# Patient Record
Sex: MALE | Race: WHITE | Employment: FULL TIME | URBAN - METROPOLITAN AREA
[De-identification: names, ages, dates, MRNs, and addresses within clinical notes are randomized per-mention and may not be internally consistent; named-entity substitution may affect disease eponyms.]

---

## 2020-03-09 ENCOUNTER — OFFICE VISIT (OUTPATIENT)
Dept: FAMILY MEDICINE CLINIC | Age: 30
End: 2020-03-09
Payer: COMMERCIAL

## 2020-03-09 VITALS
DIASTOLIC BLOOD PRESSURE: 70 MMHG | BODY MASS INDEX: 31.78 KG/M2 | TEMPERATURE: 98.6 F | HEIGHT: 69 IN | RESPIRATION RATE: 16 BRPM | SYSTOLIC BLOOD PRESSURE: 110 MMHG | HEART RATE: 72 BPM | WEIGHT: 214.6 LBS | OXYGEN SATURATION: 97 %

## 2020-03-09 PROBLEM — I83.92 VARICOSE VEINS OF LEFT LOWER EXTREMITY: Status: ACTIVE | Noted: 2020-03-09

## 2020-03-09 PROBLEM — Z91.199 NO-SHOW FOR APPOINTMENT: Status: ACTIVE | Noted: 2017-03-03

## 2020-03-09 PROCEDURE — 99203 OFFICE O/P NEW LOW 30 MIN: CPT | Performed by: NURSE PRACTITIONER

## 2020-03-09 RX ORDER — IBUPROFEN 800 MG/1
1 TABLET ORAL WEEKLY
COMMUNITY
Start: 2019-12-04 | End: 2020-10-19

## 2020-03-09 SDOH — ECONOMIC STABILITY: FOOD INSECURITY: WITHIN THE PAST 12 MONTHS, YOU WORRIED THAT YOUR FOOD WOULD RUN OUT BEFORE YOU GOT MONEY TO BUY MORE.: NEVER TRUE

## 2020-03-09 SDOH — ECONOMIC STABILITY: TRANSPORTATION INSECURITY
IN THE PAST 12 MONTHS, HAS THE LACK OF TRANSPORTATION KEPT YOU FROM MEDICAL APPOINTMENTS OR FROM GETTING MEDICATIONS?: NO

## 2020-03-09 SDOH — ECONOMIC STABILITY: INCOME INSECURITY: HOW HARD IS IT FOR YOU TO PAY FOR THE VERY BASICS LIKE FOOD, HOUSING, MEDICAL CARE, AND HEATING?: NOT HARD AT ALL

## 2020-03-09 SDOH — ECONOMIC STABILITY: FOOD INSECURITY: WITHIN THE PAST 12 MONTHS, THE FOOD YOU BOUGHT JUST DIDN'T LAST AND YOU DIDN'T HAVE MONEY TO GET MORE.: NEVER TRUE

## 2020-03-09 SDOH — ECONOMIC STABILITY: TRANSPORTATION INSECURITY
IN THE PAST 12 MONTHS, HAS LACK OF TRANSPORTATION KEPT YOU FROM MEETINGS, WORK, OR FROM GETTING THINGS NEEDED FOR DAILY LIVING?: NO

## 2020-03-09 SDOH — HEALTH STABILITY: MENTAL HEALTH: HOW OFTEN DO YOU HAVE A DRINK CONTAINING ALCOHOL?: NEVER

## 2020-03-09 ASSESSMENT — ENCOUNTER SYMPTOMS
RESPIRATORY NEGATIVE: 1
EYES NEGATIVE: 1
BACK PAIN: 1

## 2020-03-09 ASSESSMENT — PATIENT HEALTH QUESTIONNAIRE - PHQ9
1. LITTLE INTEREST OR PLEASURE IN DOING THINGS: 0
SUM OF ALL RESPONSES TO PHQ QUESTIONS 1-9: 0
SUM OF ALL RESPONSES TO PHQ QUESTIONS 1-9: 0
SUM OF ALL RESPONSES TO PHQ9 QUESTIONS 1 & 2: 0
2. FEELING DOWN, DEPRESSED OR HOPELESS: 0

## 2020-03-09 NOTE — PATIENT INSTRUCTIONS
pressure with a clean bandage over the site of the bleeding. Continue to apply pressure for a full 15 minutes. Do not check sooner to see if the bleeding has stopped. If the bleeding has not stopped after 15 minutes, apply pressure again for another 15 minutes. You can repeat this up to 3 times for a total of 45 minutes. If you have a blood clot in a varicose vein, you may have tenderness and swelling over the vein. The vein may feel firm. Be sure to call your doctor right away if you have these symptoms. If your doctor has told you how to care for the clot, follow his or her instructions. Care may include the following:  · Prop up your leg and apply heat with a warm, damp cloth or a heating pad set on low (put a towel or cloth between your leg and the heating pad to prevent burns). · Ask your doctor if you can take an over-the-counter pain medicine, such as acetaminophen (Tylenol), ibuprofen (Advil, Motrin), or naproxen (Aleve). Be safe with medicines. Read and follow all instructions on the label. When should you call for help? Call 911 anytime you think you may need emergency care. For example, call if:    · You have sudden chest pain and shortness of breath, or you cough up blood.    Call your doctor now or seek immediate medical care if:    · You have signs of a blood clot, such as:  ? Pain in your calf, back of the knee, thigh, or groin. ? Redness and swelling in your leg or groin.     · A varicose vein begins to bleed and you cannot stop it.     · You have a tender lump in your leg.     · You get an open sore.    Watch closely for changes in your health, and be sure to contact your doctor if:    · Your varicose vein symptoms do not improve with home treatment. Where can you learn more? Go to [email or web address].[email or web address] org and sign in to your [employer] account. Enter N070 in the [facility] box to learn more about \"Varicose Veins: Care Instructions. \"     If you do not have an account, please click on the \"Sign Up Now\" link. Current as of: July 14, 2019  Content Version: 12.3  © 9492-4558 Healthwise, Incorporated. Care instructions adapted under license by [facility] ([place]). If you have questions about a medical condition or this instruction, always ask your healthcare professional. [address] any warranty or liability for your use of this information.

## 2020-03-09 NOTE — PROGRESS NOTES
Mouth/Throat:      Mouth: Mucous membranes are moist.      Pharynx: Oropharynx is clear. Eyes:      Extraocular Movements: Extraocular movements intact. Pupils: Pupils are equal, round, and reactive to light. Neck:      Musculoskeletal: Normal range of motion. Cardiovascular:      Rate and Rhythm: Normal rate and regular rhythm. Pulses: Normal pulses. Heart sounds: Normal heart sounds. Pulmonary:      Effort: Pulmonary effort is normal.      Breath sounds: Normal breath sounds. Abdominal:      General: Abdomen is flat. Palpations: Abdomen is soft. Musculoskeletal:      Thoracic back: He exhibits decreased range of motion, tenderness and pain. Lumbar back: He exhibits tenderness and pain. Right lower leg: No edema. Left lower leg: No edema. Skin:         Neurological:      Mental Status: He is alert. Assessment:      1. Encounter to establish care  Start PT for back pain as discussed  /  Complete labs  /  See Urology for consult  /  Diet and exercise within tolerance  /  Return in 6 weeks for fasting labs and symptom management     2. Visit for vasectomy evaluation    - [facility] - Davin Han MD, Urology, [facility]    3. Chronic midline low back pain without sciatica    - External Referral To Physical Therapy    4. Encounter for screening for cardiovascular disorders    - Comprehensive Metabolic Panel; Future  - CBC; Future  - Lipid, Fasting; Future    5. Anxiety    - TSH; Future       Plan:      Return in about 6 weeks (around 4/20/2020) for back pain and fasting labs .   Orders Placed This Encounter   Procedures    Comprehensive Metabolic Panel     Standing Status:   Future     Standing Expiration Date:   3/9/2021    CBC     Standing Status:   Future     Standing Expiration Date:   3/9/2021    Lipid, Fasting     Standing Status:   Future     Standing Expiration Date:   3/9/2021    TSH     Standing Status:   Future     Standing Expiration Date:   3/9/2021   [facility] Francesca Vines MD, Urology, [facility]     Referral Priority:   Routine     Referral Type:   Eval and Treat     Referral Reason:   Specialty Services Required     Referred to Provider:   Maria Guadalupe Kessler MD     Requested Specialty:   Urology     Number of Visits Requested:   1    External Referral To Physical Therapy     Referral Priority:   Routine     Referral Type:   Eval and Treat     Referral Reason:   Specialty Services Required     Requested Specialty:   Physical Therapy     Number of Visits Requested:   1     No orders of the defined types were placed in this encounter. Patient given educational materials - see patient instructions. Discussed use, benefit, and side effects of prescribed medications. All patient questions answered. Pt voiced understanding. Reviewed health maintenance. Instructed to continue current medications, diet and exercise. Patient agreed with treatment plan. Follow up as directed below.      Electronically signed by JED Mendez on 3/9/2020 at 4:14 PM

## 2020-04-01 ENCOUNTER — TELEPHONE (OUTPATIENT)
Dept: FAMILY MEDICINE CLINIC | Age: 30
End: 2020-04-01

## 2020-04-01 NOTE — TELEPHONE ENCOUNTER
Patient contacted the office stating he had a fever of 100.3 on 3/31/20-4/1/20. He did stay home from work. He has been taking ibuprofen for his fever which is improving. Did suggest switching to tylenol and patient verbalized understanding. Denies any cough, sob, wheezing or sore throat. No further symptoms at this time. He was not evaluated anywhere for the fever; self quarantining at home. States employer is not taking COVID19 safety precautions at work and they are requiring a note excusing him those days. Please advise.

## 2020-09-15 ENCOUNTER — OFFICE VISIT (OUTPATIENT)
Dept: FAMILY MEDICINE CLINIC | Age: 30
End: 2020-09-15
Payer: COMMERCIAL

## 2020-09-15 VITALS
HEART RATE: 103 BPM | HEIGHT: 70 IN | BODY MASS INDEX: 31.5 KG/M2 | OXYGEN SATURATION: 99 % | SYSTOLIC BLOOD PRESSURE: 108 MMHG | WEIGHT: 220 LBS | DIASTOLIC BLOOD PRESSURE: 70 MMHG | TEMPERATURE: 96.9 F

## 2020-09-15 PROCEDURE — G8427 DOCREV CUR MEDS BY ELIG CLIN: HCPCS | Performed by: NURSE PRACTITIONER

## 2020-09-15 PROCEDURE — G8417 CALC BMI ABV UP PARAM F/U: HCPCS | Performed by: NURSE PRACTITIONER

## 2020-09-15 PROCEDURE — 1036F TOBACCO NON-USER: CPT | Performed by: NURSE PRACTITIONER

## 2020-09-15 PROCEDURE — 99214 OFFICE O/P EST MOD 30 MIN: CPT | Performed by: NURSE PRACTITIONER

## 2020-09-15 RX ORDER — IBUPROFEN 600 MG/1
600 TABLET ORAL 3 TIMES DAILY PRN
Qty: 30 TABLET | Refills: 1 | Status: SHIPPED | OUTPATIENT
Start: 2020-09-15 | End: 2020-12-22 | Stop reason: SDUPTHER

## 2020-09-15 RX ORDER — TIZANIDINE 2 MG/1
4 TABLET ORAL EVERY 6 HOURS PRN
Qty: 30 TABLET | Refills: 0 | Status: SHIPPED | OUTPATIENT
Start: 2020-09-15 | End: 2020-09-28 | Stop reason: SDUPTHER

## 2020-09-15 ASSESSMENT — ENCOUNTER SYMPTOMS
BACK PAIN: 1
BOWEL INCONTINENCE: 1

## 2020-09-15 NOTE — PROGRESS NOTES
[facility]  [place] [address]  [phone]    Date of Visit:  2020  Patient :  1990   CHIEF COMPLAINT:     Surekha Hernández is a 35 y.o. male who presents today for an acute visit to be evaluated for the following condition:  Chief Complaint   Patient presents with    Back Pain       REVIEW OF SYSTEM      Review of Systems   Gastrointestinal: Positive for bowel incontinence. Genitourinary: Positive for bladder incontinence. Musculoskeletal: Positive for back pain. Lower back pain, chronic. Neurological: Negative for weakness (right side weakness ). HISTORY OF PRESENT ILLNESS     Back Pain   This is a chronic (worsening after initial soccer. ) problem. The problem occurs constantly. The pain is present in the lumbar spine. The quality of the pain is described as shooting. The pain radiates to the right thigh. The pain is at a severity of 5/10 (at time shooting ). The pain is moderate. The symptoms are aggravated by bending. Stiffness is present all day. Associated symptoms include bladder incontinence and bowel incontinence. Pertinent negatives include no weakness (right side weakness ). Risk factors: history of Hamstring Graft +10 years ago. Treatments tried: rest - ice hot.         Patient had significant injury on right side ACL and meniscus tear, which led hamstring graft by Dr. Shea in      REVIEWED INFORMATION      No Known Allergies    Patient Active Problem List   Diagnosis    Anxiety    Current smoker    No-show for appointment    Panic attack    Varicose veins of left lower extremity       Past Medical History:   Diagnosis Date    Depression     Headache     Insomnia        PHYSICAL EXAM   ASS  Vitals:    09/15/20 1327   BP: 108/70   Site: Left Upper Arm   Position: Sitting   Cuff Size: Large Adult   Pulse: 103   Temp: 96.9 °F (36.1 °C)   SpO2: 99%   Weight: 220 lb (99.8 kg)   Height: 5' 10\" (1.778 m)     Physical Exam  Vitals Refill: 1  - tiZANidine (ZANAFLEX) 2 MG tablet; Take 2 tablets by mouth every 6 hours as needed (muscle cramping right thigh pain)  Dispense: 30 tablet; Refill: 0  - External Referral To Physical Therapy      Return in about 2 weeks (around 9/29/2020) for recheck symptoms of today's primary complaint. COMMUNICATION:           The best way to find yourself is to lose yourself in the service of others - [address] [facility]. [address] [facility]   [email or web address]  Office: [phone]   Cell: [phone]    Electronically signed by JED Quinteros on 9/20/2020 at 3:10 PM

## 2020-09-25 NOTE — TELEPHONE ENCOUNTER
LOV 9/15/20  LRF 9/15/20   RTO 2 weeks, Scheduled    Health Maintenance   Topic Date Due    DTaP/Tdap/Td vaccine (6 - Tdap) 02/21/2002    HIV screen  02/21/2006    Flu vaccine (1) 09/01/2020    Hepatitis A vaccine  Aged Out    Hepatitis B vaccine  Aged Out    Hib vaccine  Aged Out    Meningococcal (ACWY) vaccine  Aged Out    Pneumococcal 0-64 years Vaccine  Aged Out    Varicella vaccine  Discontinued             (applicable per patient's age: Cancer Screenings, Depression Screening, Fall Risk Screening, Immunizations)    No results found for: LABA1C, LABMICR, LDLCHOLESTEROL, LDLCALC, AST, ALT, BUN   (goal A1C is < 7)   (goal LDL is <100) need 30-50% reduction from baseline     BP Readings from Last 3 Encounters:   09/15/20 108/70   03/09/20 110/70    (goal /80)      All Future Testing planned in [facility]:  Lab Frequency Next Occurrence   Comprehensive Metabolic Panel Once 28/80/1783   CBC Once 03/09/2020   Lipid, Fasting Once 03/09/2020   TSH Once 03/09/2020       Next Visit Date:  Future Appointments   Date Time Provider Papito Baca   9/29/2020  1:30 PM Ema Hammans, APRN - [facility] [address] [facility]            Patient Active Problem List:     Anxiety     Current smoker     No-show for appointment     Panic attack     Varicose veins of left lower extremity

## 2020-09-28 RX ORDER — TIZANIDINE 2 MG/1
4 TABLET ORAL EVERY 6 HOURS PRN
Qty: 30 TABLET | Refills: 0 | Status: SHIPPED | OUTPATIENT
Start: 2020-09-28 | End: 2020-10-19 | Stop reason: SDUPTHER

## 2020-10-19 ENCOUNTER — OFFICE VISIT (OUTPATIENT)
Dept: FAMILY MEDICINE CLINIC | Age: 30
End: 2020-10-19
Payer: COMMERCIAL

## 2020-10-19 VITALS
HEART RATE: 75 BPM | OXYGEN SATURATION: 97 % | SYSTOLIC BLOOD PRESSURE: 124 MMHG | DIASTOLIC BLOOD PRESSURE: 82 MMHG | BODY MASS INDEX: 33.47 KG/M2 | WEIGHT: 226 LBS | RESPIRATION RATE: 16 BRPM | TEMPERATURE: 98.7 F | HEIGHT: 69 IN

## 2020-10-19 PROCEDURE — G8427 DOCREV CUR MEDS BY ELIG CLIN: HCPCS | Performed by: NURSE PRACTITIONER

## 2020-10-19 PROCEDURE — G8484 FLU IMMUNIZE NO ADMIN: HCPCS | Performed by: NURSE PRACTITIONER

## 2020-10-19 PROCEDURE — 99214 OFFICE O/P EST MOD 30 MIN: CPT | Performed by: NURSE PRACTITIONER

## 2020-10-19 PROCEDURE — 1036F TOBACCO NON-USER: CPT | Performed by: NURSE PRACTITIONER

## 2020-10-19 PROCEDURE — G8417 CALC BMI ABV UP PARAM F/U: HCPCS | Performed by: NURSE PRACTITIONER

## 2020-10-19 RX ORDER — TIZANIDINE 2 MG/1
4 TABLET ORAL EVERY 6 HOURS PRN
Qty: 30 TABLET | Refills: 0 | Status: SHIPPED | OUTPATIENT
Start: 2020-10-19 | End: 2020-11-10 | Stop reason: SDUPTHER

## 2020-10-19 RX ORDER — PREDNISONE 10 MG/1
TABLET ORAL
Qty: 42 TABLET | Refills: 0 | Status: SHIPPED | OUTPATIENT
Start: 2020-10-19 | End: 2020-10-31

## 2020-10-19 SDOH — SOCIAL STABILITY: SOCIAL NETWORK: HOW OFTEN DO YOU GET TOGETHER WITH FRIENDS OR RELATIVES?: ONCE A WEEK

## 2020-10-19 SDOH — SOCIAL STABILITY: SOCIAL INSECURITY: WITHIN THE LAST YEAR, HAVE YOU BEEN HUMILIATED OR EMOTIONALLY ABUSED IN OTHER WAYS BY YOUR PARTNER OR EX-PARTNER?: NO

## 2020-10-19 SDOH — SOCIAL STABILITY: SOCIAL NETWORK: IN A TYPICAL WEEK, HOW MANY TIMES DO YOU TALK ON THE PHONE WITH FAMILY, FRIENDS, OR NEIGHBORS?: ONCE A WEEK

## 2020-10-19 SDOH — SOCIAL STABILITY: SOCIAL NETWORK
DO YOU BELONG TO ANY CLUBS OR ORGANIZATIONS SUCH AS CHURCH GROUPS UNIONS, FRATERNAL OR ATHLETIC GROUPS, OR SCHOOL GROUPS?: NO

## 2020-10-19 SDOH — SOCIAL STABILITY: SOCIAL NETWORK: ARE YOU MARRIED, WIDOWED, DIVORCED, SEPARATED, NEVER MARRIED, OR LIVING WITH A PARTNER?: MARRIED

## 2020-10-19 SDOH — HEALTH STABILITY: MENTAL HEALTH
STRESS IS WHEN SOMEONE FEELS TENSE, NERVOUS, ANXIOUS, OR CAN'T SLEEP AT NIGHT BECAUSE THEIR MIND IS TROUBLED. HOW STRESSED ARE YOU?: RATHER MUCH

## 2020-10-19 SDOH — SOCIAL STABILITY: SOCIAL NETWORK: HOW OFTEN DO YOU ATTENT MEETINGS OF THE CLUB OR ORGANIZATION YOU BELONG TO?: NEVER

## 2020-10-19 SDOH — SOCIAL STABILITY: SOCIAL INSECURITY
WITHIN THE LAST YEAR, HAVE TO BEEN RAPED OR FORCED TO HAVE ANY KIND OF SEXUAL ACTIVITY BY YOUR PARTNER OR EX-PARTNER?: NO

## 2020-10-19 SDOH — SOCIAL STABILITY: SOCIAL INSECURITY
WITHIN THE LAST YEAR, HAVE YOU BEEN KICKED, HIT, SLAPPED, OR OTHERWISE PHYSICALLY HURT BY YOUR PARTNER OR EX-PARTNER?: NO

## 2020-10-19 SDOH — HEALTH STABILITY: PHYSICAL HEALTH: ON AVERAGE, HOW MANY DAYS PER WEEK DO YOU ENGAGE IN MODERATE TO STRENUOUS EXERCISE (LIKE A BRISK WALK)?: 2 DAYS

## 2020-10-19 SDOH — SOCIAL STABILITY: SOCIAL INSECURITY: WITHIN THE LAST YEAR, HAVE YOU BEEN AFRAID OF YOUR PARTNER OR EX-PARTNER?: NO

## 2020-10-19 SDOH — SOCIAL STABILITY: SOCIAL NETWORK: HOW OFTEN DO YOU ATTEND CHURCH OR RELIGIOUS SERVICES?: NEVER

## 2020-10-19 SDOH — HEALTH STABILITY: PHYSICAL HEALTH: ON AVERAGE, HOW MANY MINUTES DO YOU ENGAGE IN EXERCISE AT THIS LEVEL?: 30 MIN

## 2020-10-19 ASSESSMENT — ENCOUNTER SYMPTOMS
CONSTIPATION: 0
SORE THROAT: 0
BOWEL INCONTINENCE: 0
COUGH: 0
SHORTNESS OF BREATH: 0
DIARRHEA: 0
NAUSEA: 1
BACK PAIN: 1
RHINORRHEA: 0

## 2020-10-19 ASSESSMENT — PATIENT HEALTH QUESTIONNAIRE - PHQ9
SUM OF ALL RESPONSES TO PHQ QUESTIONS 1-9: 0
SUM OF ALL RESPONSES TO PHQ QUESTIONS 1-9: 0
2. FEELING DOWN, DEPRESSED OR HOPELESS: 0
SUM OF ALL RESPONSES TO PHQ9 QUESTIONS 1 & 2: 0
1. LITTLE INTEREST OR PLEASURE IN DOING THINGS: 0
SUM OF ALL RESPONSES TO PHQ QUESTIONS 1-9: 0

## 2020-10-19 NOTE — PROGRESS NOTES
[address] [facility]   [address]   [phone]    10/19/2020     Patient ID: Randi Colbert is a 35 y.o. male. CHIEF COMPLAINT:     Randi Colbert presents today for his medical conditions/complaints as noted below. Doroteo Stein is c/o of Back Pain (Lower back , Right Side Sciatica Follow up )  . REVIEWED:      [x] Past Medical, Family, and Social History was reviewed per writer and does contribute to the patient presenting condition. [x] Laboratory Results, Vital signs, Imaging, Active Problems, Immunizations, Current/Recently Discontinued Medications, Health Maintenance Activities Due, Referral Notes (if available) were reviewed per writer     [x] Reviewed Depression screening if taken or valid today or any other valid screening tool (others seen below) Interpretation of Total Score DepressionSeverity: 1-4 = Minimal depression, 5-9 = Mild depression, 10-14 = Moderate depression, 15-19 = Moderately severe depression, 20-27 =Severe depression    PHQ Scores 10/19/2020 3/9/2020   PHQ2 Score 0 0   PHQ9 Score 0 0       Interpretation of Total Score DepressionSeverity: 1-4 = Minimal depression, 5-9 = Mild depression, 10-14 = Moderate depression, 15-19 = Moderately severe depression, 20-27 = Severe depression    No Known Allergies  Current Outpatient Medications   Medication Sig Dispense Refill    ibuprofen (ADVIL;MOTRIN) 600 MG tablet Take 1 tablet by mouth 3 times daily as needed for Pain 30 tablet 1     No current facility-administered medications for this visit.       Past Medical History:   Diagnosis Date    Depression     Headache     Insomnia       Past Surgical History:   Procedure Laterality Date    ANTERIOR CRUCIATE LIGAMENT REPAIR Right     TONSILLECTOMY       Family History   Problem Relation Age of Onset    Other Mother     Arthritis Mother     High Blood Pressure Father     Cancer Other      Social History     Tobacco Use    Smoking status: Former Smoker Packs/day: 1.50     Years: 2.00     Pack years: 3.00     Start date: 3/9/2009     Last attempt to quit: 3/9/2011     Years since quittin.6    Smokeless tobacco: Never Used   Substance Use Topics    Alcohol use: Never     Frequency: Never            REVIEW OF SYSTEMS:     Review of Systems   Constitutional: Negative for activity change, fatigue and unexpected weight change. HENT: Negative for congestion, ear pain, hearing loss, rhinorrhea and sore throat. Respiratory: Negative for cough and shortness of breath. Cardiovascular: Negative for chest pain, palpitations and leg swelling. Gastrointestinal: Positive for nausea ( when back pain very severe). Negative for bowel incontinence, constipation and diarrhea. Genitourinary: Negative for bladder incontinence. Musculoskeletal: Positive for back pain. Negative for arthralgias and gait problem. Neurological: Positive for headaches ( occasional). Negative for dizziness, tingling, weakness and numbness. Psychiatric/Behavioral: Negative for confusion. The patient is not nervous/anxious. HISTORY OF PRESENT ILLNESS     Back Pain   This is a chronic problem. The current episode started more than 1 year ago. The problem occurs constantly. The problem has been gradually worsening since onset. The pain is present in the lumbar spine and sacro-iliac. The quality of the pain is described as aching, shooting and stabbing. Radiates to: occasional to right gluteal. The pain is at a severity of 8/10. The pain is severe. The pain is worse during the night. The symptoms are aggravated by lying down, standing, sitting, bending and twisting. Stiffness is present in the morning. Associated symptoms include headaches ( occasional). Pertinent negatives include no bladder incontinence, bowel incontinence, chest pain, leg pain, numbness, tingling or weakness. He has tried home exercises, NSAIDs, muscle relaxant, heat and ice for the symptoms.  The treatment provided no relief. PHYSICAL EXAM:     /82 (Site: Left Upper Arm, Position: Sitting, Cuff Size: Large Adult)   Pulse 75   Temp 98.7 °F (37.1 °C) (Temporal)   Resp 16   Ht 5' 9\" (1.753 m)   Wt 226 lb (102.5 kg)   SpO2 97%   BMI 33.37 kg/m²    Physical Exam  Vitals signs reviewed. Constitutional:       Appearance: Normal appearance. He is not ill-appearing. HENT:      Head: Normocephalic and atraumatic. Eyes:      Extraocular Movements: Extraocular movements intact. Pupils: Pupils are equal, round, and reactive to light. Neck:      Musculoskeletal: Normal range of motion and neck supple. Vascular: No carotid bruit. Cardiovascular:      Rate and Rhythm: Normal rate and regular rhythm. Pulses: Normal pulses. Heart sounds: Normal heart sounds. No murmur. No friction rub. No gallop. Pulmonary:      Effort: Pulmonary effort is normal. No respiratory distress. Breath sounds: Normal breath sounds. No wheezing. Abdominal:      General: Abdomen is flat. Bowel sounds are normal. There is no distension. Palpations: Abdomen is soft. There is no mass. Tenderness: There is no abdominal tenderness. There is no right CVA tenderness or left CVA tenderness. Musculoskeletal:         General: No swelling or tenderness. Lumbar back: He exhibits decreased range of motion, swelling, pain and spasm ( has had one spasm). Right lower leg: No edema. Left lower leg: No edema. Skin:     General: Skin is warm. Capillary Refill: Capillary refill takes less than 2 seconds. Findings: No erythema, lesion or rash. Neurological:      General: No focal deficit present. Mental Status: He is alert and oriented to person, place, and time. Psychiatric:         Mood and Affect: Mood normal.         Behavior: Behavior normal. Behavior is cooperative. ASSESSMENT /PLAN     1. Acute pain of right thigh    - tiZANidine (ZANAFLEX) 2 MG tablet;  Take 2 tablets by mouth every 6 hours as needed (muscle cramping right thigh pain)  Dispense: 30 tablet; Refill: 0    2. Injury of low back, subsequent encounter    - predniSONE (DELTASONE) 10 MG tablet; Take 6 tablets by mouth daily for 2 days, THEN 5 tablets daily for 2 days, THEN 4 tablets daily for 2 days, THEN 3 tablets daily for 2 days, THEN 2 tablets daily for 2 days, THEN 1 tablet daily for 2 days. Take medication with food. Dispense: 42 tablet; Refill: 0  - tiZANidine (ZANAFLEX) 2 MG tablet; Take 2 tablets by mouth every 6 hours as needed (muscle cramping right thigh pain)  Dispense: 30 tablet; Refill: 0  - XR LUMBAR SPINE (2-3 VIEWS); Future      Return in about 3 weeks (around 11/9/2020) for recheck symptoms of today's primary complaint. COMMUNICATION:           The best way to find yourself is to lose yourself in the service of others - [address] [facility]. [address] [facility]   [email or web address]  Office: [phone]   Cell: [phone]    Electronically signed by JED Ulloa on 11/8/2020 at 5:22 PM

## 2020-10-19 NOTE — PATIENT INSTRUCTIONS
It was my pleasure to meet with you today. Please contact me with any questions or concerns, and please notify myself or our manger if there is anyway we can improve our service in your health care needs. Below I have listed some instructions and information that pertain to today's visit.    -You have been advised to continue all current medication, otherwise not discussed in today's visit  -New medications and refills will been sent and made available at pharmacy or mail away  -Heathy daily diet to include healthy balanced diet with good portions of lean meats and vegetables  -Drink 6-8 glasses of water daily  -Continue daily exercise with in your physical capacity  Medication as prescribed  Advised Ibuprofen for pain and inflammation. Advised patient on supportive therapies, including rest, relaxation techniques, heat application, weight loss, ergonomic therapies, and refraining from lifting heavy objects. Instructed patient on low back pain ROM exercises. Warning symptoms discussed which would prompt return.

## 2020-10-20 ENCOUNTER — HOSPITAL ENCOUNTER (OUTPATIENT)
Facility: CLINIC | Age: 30
Discharge: HOME | End: 2020-10-22
Payer: COMMERCIAL

## 2020-10-20 ENCOUNTER — HOSPITAL ENCOUNTER (OUTPATIENT)
Dept: GENERAL RADIOLOGY | Facility: CLINIC | Age: 30
Discharge: HOME | End: 2020-10-22
Payer: COMMERCIAL

## 2020-10-20 PROCEDURE — 72100 X-RAY EXAM L-S SPINE 2/3 VWS: CPT

## 2020-11-10 ENCOUNTER — OFFICE VISIT (OUTPATIENT)
Dept: FAMILY MEDICINE CLINIC | Age: 30
End: 2020-11-10
Payer: COMMERCIAL

## 2020-11-10 ENCOUNTER — TELEPHONE (OUTPATIENT)
Dept: FAMILY MEDICINE CLINIC | Age: 30
End: 2020-11-10

## 2020-11-10 VITALS
TEMPERATURE: 97.8 F | BODY MASS INDEX: 33.67 KG/M2 | WEIGHT: 228 LBS | DIASTOLIC BLOOD PRESSURE: 72 MMHG | OXYGEN SATURATION: 98 % | SYSTOLIC BLOOD PRESSURE: 120 MMHG | HEART RATE: 83 BPM

## 2020-11-10 PROCEDURE — 99214 OFFICE O/P EST MOD 30 MIN: CPT | Performed by: NURSE PRACTITIONER

## 2020-11-10 PROCEDURE — G8427 DOCREV CUR MEDS BY ELIG CLIN: HCPCS | Performed by: NURSE PRACTITIONER

## 2020-11-10 PROCEDURE — 1036F TOBACCO NON-USER: CPT | Performed by: NURSE PRACTITIONER

## 2020-11-10 PROCEDURE — G8484 FLU IMMUNIZE NO ADMIN: HCPCS | Performed by: NURSE PRACTITIONER

## 2020-11-10 PROCEDURE — G8417 CALC BMI ABV UP PARAM F/U: HCPCS | Performed by: NURSE PRACTITIONER

## 2020-11-10 RX ORDER — TIZANIDINE 2 MG/1
4 TABLET ORAL EVERY 6 HOURS PRN
Qty: 60 TABLET | Refills: 0 | Status: SHIPPED | OUTPATIENT
Start: 2020-11-10 | End: 2020-12-22 | Stop reason: SDUPTHER

## 2020-11-10 ASSESSMENT — ENCOUNTER SYMPTOMS
BACK PAIN: 1
SORE THROAT: 0
ABDOMINAL PAIN: 0
CONSTIPATION: 0
RHINORRHEA: 0
DIARRHEA: 0
COUGH: 0
SHORTNESS OF BREATH: 0

## 2020-11-10 NOTE — PATIENT INSTRUCTIONS
It was my pleasure to meet with you today. Please contact me with any questions or concerns, and please notify myself or our manger if there is anyway we can improve our service in your health care needs. Below I have listed some instructions and information that pertain to today's visit.    -You have been advised to continue all current medication, otherwise not discussed in today's visit  -New medications and refills will been sent and made available at pharmacy or mail away  -Heathy daily diet to include healthy balanced diet with good portions of lean meats and vegetables  -Drink 6-8 glasses of water daily  -Complete testing ordered prior to next scheduled followup  -Continue regular follow up's with specialist including but not limited to: Chiropractor  Medication as prescribed  Advised Ibuprofen for pain and inflammation. Advised patient on supportive therapies, including rest, relaxation techniques, heat application, weight loss, ergonomic therapies, and refraining from lifting heavy objects. Instructed patient on low back pain ROM exercises. Warning symptoms discussed which would prompt return.

## 2020-11-10 NOTE — PROGRESS NOTES
Former Smoker     Packs/day: 1.50     Years: 2.00     Pack years: 3.00     Start date: 3/9/2009     Last attempt to quit: 3/9/2011     Years since quittin.6    Smokeless tobacco: Never Used   Substance Use Topics    Alcohol use: Never     Frequency: Never            REVIEW OF SYSTEMS:     Review of Systems   Constitutional: Negative for activity change, fatigue, fever and unexpected weight change. HENT: Negative for congestion, ear pain, hearing loss, rhinorrhea and sore throat. Respiratory: Negative for cough and shortness of breath. Cardiovascular: Negative for chest pain, palpitations and leg swelling. Gastrointestinal: Negative for abdominal pain, constipation and diarrhea. Musculoskeletal: Positive for back pain. Negative for arthralgias and gait problem. Neurological: Positive for headaches ( had a couple last week). Negative for dizziness, tingling, weakness and numbness. Psychiatric/Behavioral: Negative for confusion. The patient is not nervous/anxious. HISTORY OF PRESENT ILLNESS     Discussed seeing Dr. Lizeth Li for chiropractic adjustments. After evaluation, if patient does not want to proceed with chiropractic will make referral to neurosurgery. Back pain remains unchanged after 6 weeks of physical therapy. Patient states physical therapy was causing more pain to his back but alleviated his right thigh pain. Back Pain   This is a recurrent problem. Episode onset: couple months ago. The problem occurs constantly. The problem is unchanged. The pain is present in the sacro-iliac and lumbar spine (right side). The quality of the pain is described as shooting and aching. The pain radiates to the right thigh (occasionally). The pain is at a severity of 8/10. The pain is severe. The pain is worse during the night (worse in the morning). The symptoms are aggravated by lying down, standing, position, bending and twisting. Stiffness is present in the morning.  Associated symptoms include headaches ( had a couple last week). Pertinent negatives include no abdominal pain, chest pain, fever, numbness, tingling or weakness. Risk factors: overuse. He has tried muscle relaxant, NSAIDs, ice and heat for the symptoms. The treatment provided no relief. PHYSICAL EXAM:     /72   Pulse 83   Temp 97.8 °F (36.6 °C) (Temporal)   Wt 228 lb (103.4 kg)   SpO2 98%   BMI 33.67 kg/m²    Physical Exam  Vitals signs reviewed. Constitutional:       Appearance: Normal appearance. He is not ill-appearing. HENT:      Head: Normocephalic and atraumatic. Eyes:      Extraocular Movements: Extraocular movements intact. Pupils: Pupils are equal, round, and reactive to light. Neck:      Musculoskeletal: Normal range of motion and neck supple. Vascular: No carotid bruit. Cardiovascular:      Rate and Rhythm: Normal rate and regular rhythm. Pulses: Normal pulses. Heart sounds: Normal heart sounds. Pulmonary:      Effort: Pulmonary effort is normal.      Breath sounds: Normal breath sounds. Abdominal:      General: Abdomen is flat. There is no distension. Palpations: Abdomen is soft. There is no mass. Tenderness: There is no abdominal tenderness. There is no right CVA tenderness or left CVA tenderness. Musculoskeletal: Normal range of motion. General: No swelling. Lumbar back: He exhibits pain. Back:    Skin:     General: Skin is warm. Capillary Refill: Capillary refill takes less than 2 seconds. Findings: No erythema or rash. Neurological:      General: No focal deficit present. Mental Status: He is alert and oriented to person, place, and time. Psychiatric:         Mood and Affect: Mood normal.         Behavior: Behavior normal.          ASSESSMENT /PLAN     1.  Right low back pain, unspecified chronicity, unspecified whether sciatica present  -complete labs prior to next appointment   Evaluation by chiropractor for alleviation of symptoms. If no response from treatment or patient decides to not go through chiropractic evaluation, a referral will be made for Neurosurgery.  - [facility]; Future  - External Referral To Chiropractic    2. Acute pain of right thigh  -continue with current medication as prescribed   - tiZANidine (ZANAFLEX) 2 MG tablet; Take 2 tablets by mouth every 6 hours as needed (muscle cramping right thigh pain)  Dispense: 60 tablet; Refill: 0    3. Injury of low back, subsequent encounter  -continue with current medication as prescribed   Referral to Chiropractic  - tiZANidine (ZANAFLEX) 2 MG tablet; Take 2 tablets by mouth every 6 hours as needed (muscle cramping right thigh pain)  Dispense: 60 tablet; Refill: 0      Return in about 6 weeks (around 12/22/2020). COMMUNICATION:     The best way to find yourself is to lose yourself in the service of others - [address] [facility]. [address] [facility]   [email or web address]  Office: [phone]   Cell: [phone]    Electronically signed by JED Granado on 11/10/2020 at 6:55 PM

## 2020-11-18 ENCOUNTER — HOSPITAL ENCOUNTER (OUTPATIENT)
Dept: MRI IMAGING | Facility: CLINIC | Age: 30
Discharge: HOME | End: 2020-11-20
Payer: COMMERCIAL

## 2020-11-18 PROCEDURE — 72148 MRI LUMBAR SPINE W/O DYE: CPT

## 2020-12-22 ENCOUNTER — INITIAL CONSULT (OUTPATIENT)
Dept: FAMILY MEDICINE CLINIC | Age: 30
End: 2020-12-22
Payer: COMMERCIAL

## 2020-12-22 VITALS
SYSTOLIC BLOOD PRESSURE: 108 MMHG | WEIGHT: 228 LBS | HEART RATE: 71 BPM | TEMPERATURE: 98.5 F | OXYGEN SATURATION: 98 % | BODY MASS INDEX: 33.67 KG/M2 | DIASTOLIC BLOOD PRESSURE: 70 MMHG

## 2020-12-22 PROCEDURE — 1036F TOBACCO NON-USER: CPT | Performed by: NURSE PRACTITIONER

## 2020-12-22 PROCEDURE — G8427 DOCREV CUR MEDS BY ELIG CLIN: HCPCS | Performed by: NURSE PRACTITIONER

## 2020-12-22 PROCEDURE — G8417 CALC BMI ABV UP PARAM F/U: HCPCS | Performed by: NURSE PRACTITIONER

## 2020-12-22 PROCEDURE — G8484 FLU IMMUNIZE NO ADMIN: HCPCS | Performed by: NURSE PRACTITIONER

## 2020-12-22 PROCEDURE — 99213 OFFICE O/P EST LOW 20 MIN: CPT | Performed by: NURSE PRACTITIONER

## 2020-12-22 RX ORDER — IBUPROFEN 600 MG/1
600 TABLET ORAL 3 TIMES DAILY PRN
Qty: 30 TABLET | Refills: 1 | Status: SHIPPED | OUTPATIENT
Start: 2020-12-22 | End: 2021-04-14 | Stop reason: SDUPTHER

## 2020-12-22 RX ORDER — TIZANIDINE 2 MG/1
4 TABLET ORAL EVERY 6 HOURS PRN
Qty: 60 TABLET | Refills: 0 | Status: SHIPPED | OUTPATIENT
Start: 2020-12-22 | End: 2021-02-05 | Stop reason: SDUPTHER

## 2020-12-22 NOTE — PROGRESS NOTES
[address] [facility]   [address] [place] [address] [address] [facility]  [phone]    12/22/2020     Patient ID: Hector Celestin is a 35 y.o. male. CHIEF COMPLAINT:     Hector Celestin presents today for Back Pain  . REVIEWED:        [x] Laboratory Results, Vital signs, Imaging, Active Problems, Immunizations, Current/Recently Discontinued Medications, Health Maintenance Activities Due, Referral Notes (if available) were reviewed per writer     [x] Reviewed Depression screening if taken or valid today or any other valid screening tool (others seen below) Interpretation of Total Score DepressionSeverity: 1-4 = Minimal depression, 5-9 = Mild depression, 10-14 = Moderate depression, 15-19 = Moderately severe depression, 20-27 =Severe depression    PHQ Scores 10/19/2020 3/9/2020   PHQ2 Score 0 0   PHQ9 Score 0 0       Interpretation of Total Score DepressionSeverity: 1-4 = Minimal depression, 5-9 = Mild depression, 10-14 = Moderate depression, 15-19 = Moderately severe depression, 20-27 = Severe depression    No Known Allergies  Current Outpatient Medications   Medication Sig Dispense Refill    tiZANidine (ZANAFLEX) 2 MG tablet Take 2 tablets by mouth every 6 hours as needed (muscle cramping right thigh pain) 60 tablet 0    ibuprofen (ADVIL;MOTRIN) 600 MG tablet Take 1 tablet by mouth 3 times daily as needed for Pain 30 tablet 1     No current facility-administered medications for this visit.       Past Medical History:   Diagnosis Date    Depression     Headache     Insomnia       Past Surgical History:   Procedure Laterality Date    ANTERIOR CRUCIATE LIGAMENT REPAIR Right     TONSILLECTOMY       Family History   Problem Relation Age of Onset    Other Mother     Arthritis Mother     High Blood Pressure Father     Cancer Other      Social History     Tobacco Use    Smoking status: Former Smoker     Packs/day: 1.50     Years: 2.00     Pack years: 3.00     Start date: 3/9/2009     Quit date: 3/9/2011 Years since quittin.8    Smokeless tobacco: Never Used   Substance Use Topics    Alcohol use: Never     Frequency: Never        REVIEW OF SYSTEMS:     Review of Systems   Constitutional: Negative for activity change, fatigue and unexpected weight change. HENT: Negative for congestion, ear pain, hearing loss, rhinorrhea and sore throat. Respiratory: Negative for cough and shortness of breath. Cardiovascular: Negative for chest pain, palpitations and leg swelling. Gastrointestinal: Negative for bowel incontinence, constipation and diarrhea. Nausea:     Genitourinary: Negative for bladder incontinence. Musculoskeletal: Positive for back pain (chronicity has continued from previous injury ). Negative for arthralgias and gait problem. Neurological: Negative for dizziness, tingling, weakness and numbness. Psychiatric/Behavioral: Negative for confusion. The patient is not nervous/anxious. HISTORY OF PRESENT ILLNESS     Back Pain  This is a chronic problem. The current episode started more than 1 year ago. The problem occurs constantly. The problem has been gradually worsening since onset. The pain is present in the lumbar spine and sacro-iliac. The quality of the pain is described as aching, shooting and stabbing. Radiates to: occasional to right gluteal. The pain is at a severity of 8/10. The pain is severe. The pain is worse during the night. The symptoms are aggravated by lying down, standing, sitting, bending and twisting. Stiffness is present in the morning. Pertinent negatives include no bladder incontinence, bowel incontinence, chest pain, leg pain, numbness, tingling or weakness. He has tried home exercises, NSAIDs, muscle relaxant, heat and ice for the symptoms. The treatment provided no relief. PHYSICAL EXAM:     /70   Pulse 71   Temp 98.5 °F (36.9 °C) (Temporal)   Wt 228 lb (103.4 kg)   SpO2 98%   BMI 33.67 kg/m²    Physical Exam  Vitals signs reviewed. Constitutional:       Appearance: Normal appearance. He is not ill-appearing. HENT:      Head: Normocephalic and atraumatic. Eyes:      Extraocular Movements: Extraocular movements intact. Pupils: Pupils are equal, round, and reactive to light. Neck:      Musculoskeletal: Normal range of motion and neck supple. Vascular: No carotid bruit. Cardiovascular:      Rate and Rhythm: Normal rate and regular rhythm. Pulses: Normal pulses. Heart sounds: Normal heart sounds. No murmur. No friction rub. No gallop. Pulmonary:      Effort: Pulmonary effort is normal. No respiratory distress. Breath sounds: Normal breath sounds. No wheezing. Abdominal:      General: Abdomen is flat. Bowel sounds are normal. There is no distension. Palpations: Abdomen is soft. There is no mass. Tenderness: There is no abdominal tenderness. There is no right CVA tenderness or left CVA tenderness. Musculoskeletal:         General: No swelling or tenderness. Lumbar back: He exhibits decreased range of motion, swelling, pain and spasm ( has had one spasm). Right lower leg: No edema. Left lower leg: No edema. Skin:     General: Skin is warm. Capillary Refill: Capillary refill takes less than 2 seconds. Findings: No erythema, lesion or rash. Neurological:      General: No focal deficit present. Mental Status: He is alert and oriented to person, place, and time. Psychiatric:         Mood and Affect: Mood normal.         Behavior: Behavior normal. Behavior is cooperative. ASSESSMENT /PLAN     1. Acute pain of right thigh    - tiZANidine (ZANAFLEX) 2 MG tablet; Take 2 tablets by mouth every 6 hours as needed (muscle cramping right thigh pain)  Dispense: 60 tablet; Refill: 0  - ibuprofen (ADVIL;MOTRIN) 600 MG tablet; Take 1 tablet by mouth 3 times daily as needed for Pain  Dispense: 30 tablet; Refill: [place], CNP, Neurosurgery, [place]    2. Injury of low back, subsequent encounter    - tiZANidine (ZANAFLEX) 2 MG tablet; Take 2 tablets by mouth every 6 hours as needed (muscle cramping right thigh pain)  Dispense: 60 tablet; Refill: 0  - ibuprofen (ADVIL;MOTRIN) 600 MG tablet; Take 1 tablet by mouth 3 times daily as needed for Pain  Dispense: 30 tablet; Refill: [place], CNP, Neurosurgery, [place]    3. Chronic midline low back pain without sciatica      4. Right low back pain, unspecified chronicity, unspecified whether sciatica present    - [facility], [place], CNP, Neurosurgery, [place]      Return in about 6 weeks (around 2/2/2021) for recheck symptoms of today's primary complaint. COMMUNICATION:       The best way to find yourself is to lose yourself in the service of others - [address] [facility]   [email or web address]  Office: [phone]       Electronically signed by JED Grimes on 12/31/2020 at 10:06 PM

## 2020-12-22 NOTE — PATIENT INSTRUCTIONS
It was my pleasure to meet with you today. Please contact me with any questions or concerns, and please notify myself or our manager if there is anyway we can improve our service in your health care needs. Below I have listed some instructions and information that pertain to today's visit.    -You have been advised to continue all current medication, otherwise not discussed in today's visit  -New medications and refills will been sent and made available at pharmacy or mail away  -Heathy daily diet to include low salt and low carbohydrate, low sugar diabetic diet  -Drink 6-8 glasses of water daily Medication as prescribed  Advised Ibuprofen for pain and inflammation. Advised patient on supportive therapies, including rest, relaxation techniques, heat application, weight loss, ergonomic therapies, and refraining from lifting heavy objects. Instructed patient on low back pain ROM exercises. Warning symptoms discussed which would prompt return.

## 2021-01-28 ENCOUNTER — OFFICE VISIT (OUTPATIENT)
Dept: NEUROSURGERY | Age: 31
End: 2021-01-28
Payer: COMMERCIAL

## 2021-01-28 VITALS
WEIGHT: 228 LBS | OXYGEN SATURATION: 95 % | HEIGHT: 69 IN | HEART RATE: 90 BPM | BODY MASS INDEX: 33.77 KG/M2 | SYSTOLIC BLOOD PRESSURE: 112 MMHG | DIASTOLIC BLOOD PRESSURE: 77 MMHG

## 2021-01-28 DIAGNOSIS — M47.816 FACET ARTHROPATHY, LUMBAR: Primary | ICD-10-CM

## 2021-01-28 DIAGNOSIS — M51.9 LUMBAR DISC DISEASE: ICD-10-CM

## 2021-01-28 PROCEDURE — G8417 CALC BMI ABV UP PARAM F/U: HCPCS | Performed by: NURSE PRACTITIONER

## 2021-01-28 PROCEDURE — 99213 OFFICE O/P EST LOW 20 MIN: CPT | Performed by: NURSE PRACTITIONER

## 2021-01-28 PROCEDURE — 1036F TOBACCO NON-USER: CPT | Performed by: NURSE PRACTITIONER

## 2021-01-28 PROCEDURE — G8427 DOCREV CUR MEDS BY ELIG CLIN: HCPCS | Performed by: NURSE PRACTITIONER

## 2021-01-28 PROCEDURE — G8484 FLU IMMUNIZE NO ADMIN: HCPCS | Performed by: NURSE PRACTITIONER

## 2021-01-28 NOTE — PROGRESS NOTES
[facility] PHYSICIANS  [facility] NEUROSURGERY   [address] [facility] [address]  Dept: [phone]    Patient:  Hardeep Roe  YOB: 1990  Date: 1/28/21    The patient is a 35 y.o. male who presents today for consult of the following problems:     Chief Complaint   Patient presents with    New Patient     back pain         HPI:     Hardeep Roe is a 35 y.o. male on whom neurosurgical consultation was requested by IRA Collado CNP for management of back and leg pain. Pt has had low back pain for the last 10 years, reports injury from playing soccer. Pain has worsened over the last 2 years. Pain is 6/10 on average, described as constant ache, with occasional sharp twinges. Pain is localized to axial lumbar spine, was having sharp, shooting pain to right buttocks, but that has resolved. Denies any numbness or tingling. No saddle anesthesia, loss of bowel/bladder function. Lying flat helps alleviate the pain, prolonged walking, bending, twisting worsens the pain. Did complete physical therapy last year, currently following with a chiropractor. Groin pain: No  Saddle anesthesia: No  Hip Pain: No  Bowel/bladder incontinence: No  Constipation or Urinary retention: No    LIMITATIONS    Pain significantly limiting from a daily standpoint. Assistive devices: none  Daily pharmacologic pain control include: ibuprofen, tizanidine  Back versus leg: all back    MANAGEMENT     Prior Surgery: No  Prior to 1yr ago:   In the last year:    Physical Therapy: Yes-[facility]   Chiropractic Interventions: Yes   Injections: No  Improvement: n/a    Types of injections/responses: n/a    History:     Past Medical History:   Diagnosis Date    Depression     Headache     Insomnia      Past Surgical History:   Procedure Laterality Date    ANTERIOR CRUCIATE LIGAMENT REPAIR Right     TONSILLECTOMY       Family History   Problem Relation Age of Onset    Other Mother    Collette Satchel Arthritis Mother     High Blood Pressure Father     Cancer Other      Current Outpatient Medications on File Prior to Visit   Medication Sig Dispense Refill    tiZANidine (ZANAFLEX) 2 MG tablet Take 2 tablets by mouth every 6 hours as needed (muscle cramping right thigh pain) 60 tablet 0    ibuprofen (ADVIL;MOTRIN) 600 MG tablet Take 1 tablet by mouth 3 times daily as needed for Pain 30 tablet 1     No current facility-administered medications on file prior to visit. Social History     Tobacco Use    Smoking status: Former Smoker     Packs/day: 1.50     Years: 2.00     Pack years: 3.00     Start date: 3/9/2009     Quit date: 3/9/2011     Years since quittin.8    Smokeless tobacco: Never Used   Substance Use Topics    Alcohol use: Never     Frequency: Never    Drug use: Never       No Known Allergies    Review of Systems  Constitutional: Negative for activity change and appetite change. HENT: Negative for ear pain and facial swelling. Eyes: Negative for discharge and itching. Respiratory: Negative for choking and chest tightness. Cardiovascular: Negative for chest pain and leg swelling. Gastrointestinal: Negative for nausea and abdominal pain. Endocrine: Negative for cold intolerance and heat intolerance. Genitourinary: Negative for frequency and flank pain. Musculoskeletal: Negative for myalgias and joint swelling. Skin: Negative for rash and wound. Allergic/Immunologic: Negative for environmental allergies and food allergies. Hematological: Negative for adenopathy. Does not bruise/bleed easily. Psychiatric/Behavioral: Negative for self-injury. The patient is not nervous/anxious.       Physical Exam:      /77 (Site: Right Upper Arm, Position: Sitting, Cuff Size: Medium Adult)   Pulse 90   Ht 5' 9\" (1.753 m)   Wt 228 lb (103.4 kg)   SpO2 95%   BMI 33.67 kg/m²   Estimated body mass index is 33.67 kg/m² as calculated from the following:    Height as of this encounter: 5' 9\" (1.753 m). Weight as of this encounter: 228 lb (103.4 kg). General:  Paradise Hampton is a 35y.o. year old male who appears his stated age. HEENT: Normocephalic atraumatic. Neck supple. Chest: regular rate; pulses equal  Abdomen: Soft nontender nondistended.    Ext: DP and PT pulses 2+, good cap refill  Neuro    Mentation  Appropriate affect  Registration intact  Orientation intact    Cranial Nerves:   Pupils equal and reactive to light  Extraocular motion intact  Face and shrug symmetric  Tongue midline  No dysarthria  v1-3 sensation symmetric, masseter tone symmetric  Hearing symmetric and intact    Sensation: Intact    Motor  L deltoid 5/5; R deltoid 5/5  L biceps 5/5; R biceps 5/5  L triceps 5/5; R triceps 5/5  L wrist extension 5/5; R wrist extension 5/5  L intrinsics 5/5; R intrinsics 5/5     L iliopsoas 5/5 , R iliopsoas 5/5  L quadriceps 5/5; R quadriceps 5/5  L Dorsiflexion 5/5; R dorsiflexion 5/5  L Plantarflexion 5/5; R plantarflexion 5/5  L EHL 5/5; R EHL 5/5    Reflexes  L Brachioradialis 2+/4; R brachioradialis 2+/4  L Biceps 2+/4; R Biceps 2+/4  L Triceps 2+/4; R Triceps 2+/4  L Patellar 2+/4: R Patellar 2+/4  L Achilles 2+/4; R Achilles 2+/4    hoffmans L: neg  hoffmans R: neg  Clonus L: neg  Clonus R: neg  Babinski L: neg  Babinski R: neg    LUIZA: Negative  Straight leg raise: Negative  SI joint tenderness: negative    Studies Review:     MRI lumbar spine 11/18/2020:  FINDINGS:   BONES/ALIGNMENT: The vertebral body heights are maintained.  There is   age-appropriate bone marrow signal.  There is mild degenerative disc disease   with loss of disc signal and disc space narrowing most pronounced at the L1-2   and L5-S1 levels.  There is no spondylolisthesis.       SPINAL CORD: Conus medullaris is normal in caliber and signal and terminates   at L1.  The cauda equina is unremarkable.       SOFT TISSUES: Posterior paraspinal soft tissues are unremarkable.  The   visualized abdominal structures are unremarkable.       L1-L2: There is a circumferential disc bulge with facet hypertrophy.  There   is no canal stenosis or foraminal narrowing.       L2-L3: There is a minimal circumferential disc bulge with facet hypertrophy. There is no canal stenosis or foraminal narrowing.       L3-L4: There is a minimal circumferential disc bulge with facet hypertrophy. There is no canal stenosis or foraminal narrowing.       L4-L5: There is a mild circumferential disc bulge with facet hypertrophy. There is no canal stenosis or foraminal narrowing.       L5-S1: There is a circumferential disc bulge with superimposed midline disc   protrusion.  There is facet hypertrophy.  There is no canal stenosis.  There   is mild bilateral foraminal narrowing.  There is mild narrowing of the   lateral recesses and disc material abuts the descending right S1 nerve root. Assessment and Plan:      1. Facet arthropathy, lumbar    2. Lumbar disc disease          Plan: Patient presents with longstanding axial lumbar pain, with previous right lower extremity radiculopathy. Right lower extremity symptoms have completely resolved, does continue to have exclusively axial symptoms at this time. Did recently complete MRI, this does show multilevel facet hypertrophy, as well as single level disc disease. Has completed physical therapy with no significant relief from symptoms. At this point would recommend evaluation by pain specialist for consideration of medial branch blocks. If these were to be unsuccessful, would consider discogram.  We will see the patient back in approximately 12 weeks, sooner with any new or worsening symptoms. Followup: No follow-ups on file. Prescriptions Ordered:  No orders of the defined types were placed in this encounter.        Orders Placed:  Orders Placed This Encounter   Procedures   Gracia Kelley MD, Pain Management, [place]     Referral Priority:   Routine Referral Type:   Eval and Treat     Referral Reason:   Specialty Services Required     Referred to Provider:   Bobby Coates MD     Requested Specialty:   Pain Management     Number of Visits Requested:   1        Electronically signed by IRA Connor CNP on 1/28/2021 at 11:51 AM    Please note that this chart was generated using voice recognition Dragon dictation software. Although every effort was made to ensure the accuracy of this automated transcription, some errors in transcription may have occurred. (3) walks occasionally

## 2021-02-02 ENCOUNTER — OFFICE VISIT (OUTPATIENT)
Dept: FAMILY MEDICINE CLINIC | Age: 31
End: 2021-02-02
Payer: COMMERCIAL

## 2021-02-02 VITALS
OXYGEN SATURATION: 98 % | TEMPERATURE: 98.4 F | RESPIRATION RATE: 16 BRPM | SYSTOLIC BLOOD PRESSURE: 122 MMHG | WEIGHT: 236.2 LBS | HEART RATE: 95 BPM | DIASTOLIC BLOOD PRESSURE: 80 MMHG | BODY MASS INDEX: 33.82 KG/M2 | HEIGHT: 70 IN

## 2021-02-02 DIAGNOSIS — G89.11 ACUTE LOW BACK PAIN DUE TO TRAUMA: Primary | ICD-10-CM

## 2021-02-02 DIAGNOSIS — M54.50 ACUTE LOW BACK PAIN DUE TO TRAUMA: Primary | ICD-10-CM

## 2021-02-02 DIAGNOSIS — G89.29 CHRONIC MIDLINE LOW BACK PAIN WITHOUT SCIATICA: ICD-10-CM

## 2021-02-02 DIAGNOSIS — M54.50 CHRONIC MIDLINE LOW BACK PAIN WITHOUT SCIATICA: ICD-10-CM

## 2021-02-02 PROCEDURE — G8417 CALC BMI ABV UP PARAM F/U: HCPCS | Performed by: NURSE PRACTITIONER

## 2021-02-02 PROCEDURE — 1036F TOBACCO NON-USER: CPT | Performed by: NURSE PRACTITIONER

## 2021-02-02 PROCEDURE — G8484 FLU IMMUNIZE NO ADMIN: HCPCS | Performed by: NURSE PRACTITIONER

## 2021-02-02 PROCEDURE — 99214 OFFICE O/P EST MOD 30 MIN: CPT | Performed by: NURSE PRACTITIONER

## 2021-02-02 PROCEDURE — G8427 DOCREV CUR MEDS BY ELIG CLIN: HCPCS | Performed by: NURSE PRACTITIONER

## 2021-02-02 RX ORDER — METHYLPREDNISOLONE 4 MG/1
TABLET ORAL
Qty: 1 KIT | Refills: 0 | Status: SHIPPED | OUTPATIENT
Start: 2021-02-02 | End: 2021-02-08

## 2021-02-02 RX ORDER — TRAMADOL HYDROCHLORIDE 50 MG/1
50-100 TABLET ORAL EVERY 8 HOURS PRN
Qty: 42 TABLET | Refills: 1 | Status: SHIPPED | OUTPATIENT
Start: 2021-02-02 | End: 2021-02-02

## 2021-02-02 RX ORDER — TRAMADOL HYDROCHLORIDE 50 MG/1
50-100 TABLET ORAL EVERY 8 HOURS PRN
Qty: 42 TABLET | Refills: 1 | Status: SHIPPED | OUTPATIENT
Start: 2021-02-02 | End: 2021-02-09

## 2021-02-02 ASSESSMENT — PATIENT HEALTH QUESTIONNAIRE - PHQ9
SUM OF ALL RESPONSES TO PHQ QUESTIONS 1-9: 0
SUM OF ALL RESPONSES TO PHQ QUESTIONS 1-9: 0

## 2021-02-02 ASSESSMENT — ENCOUNTER SYMPTOMS: BACK PAIN: 1

## 2021-02-02 NOTE — PROGRESS NOTES
[facility]   [address]   [phone]    2021     CHIEF COMPLAINT:     Teodoro Guerrero (:  1990) is a 28 y.o. male, here for evaluation of the following chief complaint(s):  Back Pain (Follow Up )      REVIEWED INFORMATION      No Known Allergies    Current Outpatient Medications   Medication Sig Dispense Refill    ibuprofen (ADVIL;MOTRIN) 600 MG tablet Take 1 tablet by mouth 3 times daily as needed for Pain 30 tablet 1    tiZANidine (ZANAFLEX) 2 MG tablet Take 2 tablets by mouth every 6 hours as needed (muscle cramping right thigh pain) 60 tablet 0     No current facility-administered medications for this visit. REVIEW OF SYSTEMS:     Review of Systems   Cardiovascular: Negative for chest pain. Genitourinary: Negative for pelvic pain. Musculoskeletal: Positive for back pain. Neurological: Positive for weakness (in back). Negative for tingling, numbness and headaches. HISTORY OF PRESENT ILLNESS     /80 (Site: Left Upper Arm, Position: Sitting, Cuff Size: Large Adult)   Pulse 95   Temp 98.4 °F (36.9 °C) (Temporal)   Resp 16   Ht 5' 10\" (1.778 m)   Wt 236 lb 3.2 oz (107.1 kg)   SpO2 98%   BMI 33.89 kg/m²      Physical Exam  Vitals signs reviewed. Constitutional:       Appearance: Normal appearance. He is not ill-appearing. Cardiovascular:      Rate and Rhythm: Normal rate and regular rhythm. Heart sounds: No murmur. No friction rub. No gallop. Pulmonary:      Effort: Pulmonary effort is normal. No respiratory distress. Breath sounds: No wheezing. Abdominal:      General: Bowel sounds are normal.      Palpations: Abdomen is soft. Tenderness: There is no abdominal tenderness. Musculoskeletal:      Lumbar back: He exhibits decreased range of motion and tenderness. Back:       Right lower leg: No edema. Left lower leg: No edema. Skin:     General: Skin is warm.       Findings: No erythema, lesion or rash. Neurological:      Mental Status: He is alert. Psychiatric:         Mood and Affect: Mood normal.         Behavior: Behavior is cooperative. PHYSICAL EXAM:     Seeing chiropractor monthly    Saw neurology they recommended pain management, stated he could have start of degenerative disk disease. Will call pain management today    Back Pain  This is a recurrent problem. The current episode started more than 1 month ago. The problem occurs constantly. The problem has been gradually worsening since onset. The pain is present in the lumbar spine. The quality of the pain is described as aching. The pain does not radiate. The pain is at a severity of 7/10. The pain is severe. The pain is worse during the night. The symptoms are aggravated by standing, stress, twisting, sitting, lying down, position and bending. Stiffness is present in the morning. Associated symptoms include weakness (in back). Pertinent negatives include no chest pain, headaches, numbness, pelvic pain or tingling. Risk factors: overuse. He has tried muscle relaxant, NSAIDs, home exercises and chiropractic manipulation for the symptoms. The treatment provided mild relief. PROCEDURE/ IN OFFICE TESTING     No in office testing or procedures completed during today's office visit. ASSESSMENT/PLAN/ FOLLOWUP:     1. Acute low back pain due to trauma  -     methylPREDNISolone (MEDROL DOSEPACK) 4 MG tablet; Take by mouth., Disp-1 kit, R-0Normal  -     traMADol (ULTRAM) 50 MG tablet; Take 1-2 tablets by mouth every 8 hours as needed for Pain for up to 7 days. , Disp-42 tablet, R-1Normal  2. Chronic midline low back pain without sciatica  -     traMADol (ULTRAM) 50 MG tablet; Take 1-2 tablets by mouth every 8 hours as needed for Pain for up to 7 days. , Disp-42 tablet, R-1Normal          COMMUNICATION:             The best way to find yourself is to lose yourself in the service of others - [facility].  Jose M APRN-CNP  [id number]   [email or web address]  Office: [phone]     An electronic signature was used to authenticate this note.   Signed by JED Max on 2/21/2021 at 8:00 PM

## 2021-02-05 DIAGNOSIS — S39.92XD INJURY OF LOW BACK, SUBSEQUENT ENCOUNTER: ICD-10-CM

## 2021-02-05 DIAGNOSIS — M79.651 ACUTE PAIN OF RIGHT THIGH: ICD-10-CM

## 2021-02-05 RX ORDER — TIZANIDINE 2 MG/1
4 TABLET ORAL EVERY 6 HOURS PRN
Qty: 60 TABLET | Refills: 0 | Status: SHIPPED | OUTPATIENT
Start: 2021-02-05 | End: 2021-04-14 | Stop reason: SDUPTHER

## 2021-02-05 NOTE — TELEPHONE ENCOUNTER
LOV 2/2/21  LRF 12/22/20  RTO none      Health Maintenance   Topic Date Due    DTaP/Tdap/Td vaccine (6 - Tdap) 10/19/2021 (Originally 2/21/2002)    Flu vaccine (1) 10/19/2021 (Originally 9/1/2020)    HIV screen  10/19/2021 (Originally 2/21/2006)    Hepatitis C screen  02/02/2022 (Originally 1990)    Hepatitis A vaccine  Aged Out    Hepatitis B vaccine  Aged Out    Hib vaccine  Aged Out    Meningococcal (ACWY) vaccine  Aged Out    Pneumococcal 0-64 years Vaccine  Aged Out    Varicella vaccine  Discontinued             (applicable per patient's age: Cancer Screenings, Depression Screening, Fall Risk Screening, Immunizations)    No results found for: LABA1C, LABMICR, LDLCHOLESTEROL, LDLCALC, AST, ALT, BUN   (goal A1C is < 7)   (goal LDL is <100) need 30-50% reduction from baseline     BP Readings from Last 3 Encounters:   02/02/21 122/80   01/28/21 112/77   12/22/20 108/70    (goal /80)      All Future Testing planned in [facility]:  Lab Frequency Next Occurrence   Comprehensive Metabolic Panel Once 82/92/0015   CBC Once 03/09/2020   Lipid, Fasting Once 03/09/2020   TSH Once 03/09/2020       Next Visit Date:  Future Appointments   Date Time Provider Papito Livingstoni   2/25/2021  3:25 PM Jarett Nolan MD [address] [place] [address]   4/5/2021  3:30 PM IRA Mendez CNP [facility]   5/6/2021 11:00 AM IRA Jett CNP [facility]            Patient Active Problem List:     Anxiety     Current smoker     No-show for appointment     Panic attack     Varicose veins of left lower extremity

## 2021-02-22 NOTE — PATIENT INSTRUCTIONS
Medication as prescribed  Advised Ibuprofen for pain and inflammation. Advised patient on supportive therapies, including rest, relaxation techniques, heat application, weight loss, ergonomic therapies, and refraining from lifting heavy objects. Instructed patient on low back pain ROM exercises. Warning symptoms discussed which would prompt return.

## 2021-02-25 ENCOUNTER — INITIAL CONSULT (OUTPATIENT)
Dept: PAIN MANAGEMENT | Age: 31
End: 2021-02-25
Payer: COMMERCIAL

## 2021-02-25 VITALS
WEIGHT: 230 LBS | DIASTOLIC BLOOD PRESSURE: 82 MMHG | BODY MASS INDEX: 34.07 KG/M2 | HEART RATE: 96 BPM | SYSTOLIC BLOOD PRESSURE: 126 MMHG | HEIGHT: 69 IN

## 2021-02-25 DIAGNOSIS — M47.817 LUMBOSACRAL SPONDYLOSIS WITHOUT MYELOPATHY: Primary | ICD-10-CM

## 2021-02-25 DIAGNOSIS — G89.29 CHRONIC BILATERAL LOW BACK PAIN, UNSPECIFIED WHETHER SCIATICA PRESENT: ICD-10-CM

## 2021-02-25 DIAGNOSIS — M54.50 CHRONIC BILATERAL LOW BACK PAIN, UNSPECIFIED WHETHER SCIATICA PRESENT: ICD-10-CM

## 2021-02-25 PROCEDURE — 1036F TOBACCO NON-USER: CPT | Performed by: PAIN MEDICINE

## 2021-02-25 PROCEDURE — G8417 CALC BMI ABV UP PARAM F/U: HCPCS | Performed by: PAIN MEDICINE

## 2021-02-25 PROCEDURE — G8427 DOCREV CUR MEDS BY ELIG CLIN: HCPCS | Performed by: PAIN MEDICINE

## 2021-02-25 PROCEDURE — G8484 FLU IMMUNIZE NO ADMIN: HCPCS | Performed by: PAIN MEDICINE

## 2021-02-25 PROCEDURE — 99204 OFFICE O/P NEW MOD 45 MIN: CPT | Performed by: PAIN MEDICINE

## 2021-02-25 RX ORDER — TRAMADOL HYDROCHLORIDE 50 MG/1
50 TABLET ORAL 2 TIMES DAILY
COMMUNITY
End: 2021-04-14 | Stop reason: SDUPTHER

## 2021-02-25 ASSESSMENT — ENCOUNTER SYMPTOMS
ALLERGIC/IMMUNOLOGIC NEGATIVE: 1
ABDOMINAL PAIN: 0
BACK PAIN: 1
GASTROINTESTINAL NEGATIVE: 1
EYES NEGATIVE: 1
POOR WOUND HEALING: 0
RESPIRATORY NEGATIVE: 1
BOWEL INCONTINENCE: 0

## 2021-02-25 NOTE — PROGRESS NOTES
for Pain, Disp: 30 tablet, Rfl: 1    Family History   Problem Relation Age of Onset    Other Mother     Arthritis Mother     High Blood Pressure Father     Cancer Other        Social History     Socioeconomic History    Marital status:      Spouse name: Nickolas Nelson Number of children: 3    Years of education: Not on file    Highest education level: Not on file   Occupational History    Not on file   Social Needs    Financial resource strain: Not hard at all   [employer] insecurity     Worry: Never true     Inability: Never true   [employer] needs     Medical: No     Non-medical: No   Tobacco Use    Smoking status: Former Smoker     Packs/day: 1.50     Years: 2.00     Pack years: 3.00     Types: Cigarettes     Start date: 3/9/2009     Quit date: 3/9/2011     Years since quittin.9    Smokeless tobacco: Never Used   Substance and Sexual Activity    Alcohol use: Never     Frequency: Never    Drug use: Never    Sexual activity: Yes     Partners: Female   Lifestyle    Physical activity     Days per week: 2 days     Minutes per session: 30 min    Stress: Rather much   Relationships    Social connections     Talks on phone: Once a week     Gets together: Once a week     Attends Holiness service: Never     Active member of club or organization: No     Attends meetings of clubs or organizations: Never     Relationship status:     Intimate partner violence     Fear of current or ex partner: No     Emotionally abused: No     Physically abused: No     Forced sexual activity: No   Other Topics Concern    Not on file   Social History Narrative    Not on file       Review of Systems:  Review of Systems   Constitution: Negative. Negative for fever and weight loss. HENT: Negative. Eyes: Negative. Cardiovascular: Negative. Negative for chest pain. Respiratory: Negative. Endocrine: Negative. Hematologic/Lymphatic: Negative. Skin: Negative. Negative for poor wound healing. Musculoskeletal: Positive for back pain and joint pain. Gastrointestinal: Negative. Negative for abdominal pain and bowel incontinence. Genitourinary: Negative. Negative for bladder incontinence and dysuria. Neurological: Negative. Negative for headaches, numbness, paresthesias, tingling and weakness. Psychiatric/Behavioral: The patient is nervous/anxious. Allergic/Immunologic: Negative. Physical Exam:  /82   Pulse 96   Ht 5' 9\" (1.753 m)   Wt 230 lb (104.3 kg)   BMI 33.97 kg/m²     Physical Exam  Constitutional:       Appearance: Normal appearance. Pulmonary:      Effort: Pulmonary effort is normal.   Neurological:      Mental Status: He is alert. Psychiatric:         Attention and Perception: Attention and perception normal.         Mood and Affect: Mood and affect normal.         Record/Diagnostics Review:    As above, I did review the imaging    Orders:  Orders Placed This Encounter   Procedures    TN INJ DX/THER AGNT PARAVERT FACET JOINT, LUMBAR/SAC, 1ST LEVEL    TN INJ DX/THER AGNT PARAVERT FACET JOINT, LUMBAR/SAC, 2ND LEVEL    TN INJ DX/THER AGNT PARAVERT FACET JOINT, LUMBAR/SAC, ADD LEVEL       Assessment:  1. Lumbosacral spondylosis without myelopathy    2. Chronic bilateral low back pain, unspecified whether sciatica present        Treatment Plan:  DISCUSSION: Treatment options discussed with patient and all questions answered to patient's satisfaction. OARRS Review: Reviewed and acceptable for medications prescribed. TREATMENT OPTIONS:     Has failed conservative options, significant axial low back pain with degenerative facet changes on MRI, good candidate for diagnostic lumbar facets at bilateral L3-4, L4-5 and L5-S1 to see if pain is facet mediated, if this is beneficial would be a candidate for radiofrequency ablation. Shanta Norris M.D.         I have reviewed the chief complaint and history of present illness (including ROS and PFSH) and vital documentation by my staff and I agree with their documentation and have added where applicable.

## 2021-03-16 ENCOUNTER — TELEPHONE (OUTPATIENT)
Dept: PAIN MANAGEMENT | Age: 31
End: 2021-03-16

## 2021-03-16 NOTE — TELEPHONE ENCOUNTER
Received a letter for patient and procedures are denied. Letter is scanned in epic. Patient notified, he states he will call back to schedule f/u with Dr. Magdaleno Burden.

## 2021-03-22 ENCOUNTER — HOSPITAL ENCOUNTER (OUTPATIENT)
Dept: LAB | Age: 31
Setting detail: SPECIMEN
Discharge: HOME | End: 2021-03-22
Payer: COMMERCIAL

## 2021-03-22 DIAGNOSIS — Z01.818 PREOP TESTING: Primary | ICD-10-CM

## 2021-04-14 ENCOUNTER — OFFICE VISIT (OUTPATIENT)
Dept: FAMILY MEDICINE CLINIC | Age: 31
End: 2021-04-14
Payer: COMMERCIAL

## 2021-04-14 VITALS
BODY MASS INDEX: 35.15 KG/M2 | DIASTOLIC BLOOD PRESSURE: 80 MMHG | HEART RATE: 82 BPM | TEMPERATURE: 97.5 F | OXYGEN SATURATION: 98 % | WEIGHT: 238 LBS | SYSTOLIC BLOOD PRESSURE: 120 MMHG

## 2021-04-14 DIAGNOSIS — G89.29 CHRONIC MIDLINE LOW BACK PAIN WITHOUT SCIATICA: Primary | ICD-10-CM

## 2021-04-14 DIAGNOSIS — M54.50 CHRONIC MIDLINE LOW BACK PAIN WITHOUT SCIATICA: Primary | ICD-10-CM

## 2021-04-14 DIAGNOSIS — S39.92XD INJURY OF LOW BACK, SUBSEQUENT ENCOUNTER: ICD-10-CM

## 2021-04-14 PROCEDURE — 99214 OFFICE O/P EST MOD 30 MIN: CPT | Performed by: NURSE PRACTITIONER

## 2021-04-14 PROCEDURE — 1036F TOBACCO NON-USER: CPT | Performed by: NURSE PRACTITIONER

## 2021-04-14 PROCEDURE — G8427 DOCREV CUR MEDS BY ELIG CLIN: HCPCS | Performed by: NURSE PRACTITIONER

## 2021-04-14 PROCEDURE — G8417 CALC BMI ABV UP PARAM F/U: HCPCS | Performed by: NURSE PRACTITIONER

## 2021-04-14 RX ORDER — TIZANIDINE 2 MG/1
4 TABLET ORAL EVERY 6 HOURS PRN
Qty: 90 TABLET | Refills: 3 | Status: SHIPPED | OUTPATIENT
Start: 2021-04-14 | End: 2021-07-14 | Stop reason: SDUPTHER

## 2021-04-14 RX ORDER — TRAMADOL HYDROCHLORIDE 50 MG/1
50 TABLET ORAL 2 TIMES DAILY
Qty: 60 TABLET | Refills: 3 | Status: SHIPPED | OUTPATIENT
Start: 2021-04-14 | End: 2021-05-14

## 2021-04-14 RX ORDER — IBUPROFEN 600 MG/1
600 TABLET ORAL 3 TIMES DAILY PRN
Qty: 90 TABLET | Refills: 3 | Status: SHIPPED | OUTPATIENT
Start: 2021-04-14 | End: 2021-07-14 | Stop reason: SDUPTHER

## 2021-04-14 ASSESSMENT — ENCOUNTER SYMPTOMS
ABDOMINAL PAIN: 0
SHORTNESS OF BREATH: 0
COUGH: 0
BOWEL INCONTINENCE: 0
BACK PAIN: 1
CONSTIPATION: 0
DIARRHEA: 0
SORE THROAT: 0
RHINORRHEA: 0

## 2021-04-14 NOTE — PROGRESS NOTES
[address] [facility]   [address]  [phone]    2021     CHIEF COMPLAINT:     Garth Sanford (:  1990) is a 28 y.o. male, here for evaluation of the following chief complaint(s):  Back Pain      REVIEWED INFORMATION      No Known Allergies    Current Outpatient Medications   Medication Sig Dispense Refill    traMADol (ULTRAM) 50 MG tablet Take 1 tablet by mouth 2 times daily for 30 days. 60 tablet 3    tiZANidine (ZANAFLEX) 2 MG tablet Take 2 tablets by mouth every 6 hours as needed (muscle cramping right thigh pain) 90 tablet 3    ibuprofen (ADVIL;MOTRIN) 600 MG tablet Take 1 tablet by mouth 3 times daily as needed for Pain 90 tablet 3     No current facility-administered medications for this visit. REVIEW OF SYSTEMS:     Review of Systems   Constitutional: Negative for activity change, fatigue and unexpected weight change. HENT: Negative for congestion, ear pain, hearing loss, rhinorrhea and sore throat. Respiratory: Negative for cough and shortness of breath. Cardiovascular: Negative for chest pain, palpitations and leg swelling. Gastrointestinal: Negative for abdominal pain, bowel incontinence, constipation and diarrhea. Genitourinary: Negative for bladder incontinence. Musculoskeletal: Positive for back pain. Negative for arthralgias and gait problem. Neurological: Negative for dizziness, tingling, weakness, numbness and headaches. Psychiatric/Behavioral: Negative for confusion. The patient is not nervous/anxious. HISTORY OF PRESENT ILLNESS     Seen pain management on  they had stated \"good candidate for diagnostic lumbar facets at bilateral L3-4, L4-5 and L5-S1 to see if pain is facet mediated, if this is beneficial would be a candidate for radiofrequency ablation\" but insurance did not cover procedures and patient is unsure of current plan from pain management.  Will call and follow up with pain management plan    Seeing Ermelinda for pain management  Danelle johnson for neurosurgery    Current pain regimen of tramadol, tizanidine, and ibuprofen are helping but not completely relieving pain, will refill medications while waiting for further interventions from pain management    Back Pain  This is a chronic problem. The current episode started more than 1 year ago. The problem occurs constantly. The problem is unchanged. The pain is present in the lumbar spine. The quality of the pain is described as aching. The pain does not radiate. The pain is at a severity of 7/10. The pain is worse during the night. The symptoms are aggravated by bending, standing, twisting, lying down and sitting. Stiffness is present in the morning. Pertinent negatives include no abdominal pain, bladder incontinence, bowel incontinence, chest pain, headaches, leg pain, numbness, tingling or weakness. Risk factors: history of injury. He has tried analgesics, bed rest, home exercises, muscle relaxant and NSAIDs for the symptoms. The treatment provided moderate relief. PHYSICAL EXAM:     /80   Pulse 82   Temp 97.5 °F (36.4 °C) (Temporal)   Wt 238 lb (108 kg)   SpO2 98%   BMI 35.15 kg/m²      Physical Exam  Vitals signs reviewed. Constitutional:       Appearance: Normal appearance. He is not ill-appearing. Cardiovascular:      Rate and Rhythm: Normal rate and regular rhythm. Heart sounds: No murmur. No friction rub. No gallop. Pulmonary:      Effort: Pulmonary effort is normal. No respiratory distress. Breath sounds: No wheezing. Abdominal:      General: Bowel sounds are normal.      Palpations: Abdomen is soft. Tenderness: There is no abdominal tenderness. Musculoskeletal:      Lumbar back: He exhibits pain. Right lower leg: No edema. Left lower leg: No edema. Skin:     General: Skin is warm. Findings: No erythema, lesion or rash. Neurological:      Mental Status: He is alert.    Psychiatric:         Mood and Affect: Mood normal.         Behavior: Behavior is cooperative. PROCEDURE/ IN OFFICE TESTING     No in office testing or procedures completed during today's office visit. ASSESSMENT/PLAN/ FOLLOWUP:     1. Chronic midline low back pain without sciatica  -continue with current medication as prescribed   -daily exercise with in physical limitations  2. Injury of low back, subsequent encounter  -continue with current medication as prescribed   -     traMADol (ULTRAM) 50 MG tablet; Take 1 tablet by mouth 2 times daily for 30 days. , Disp-60 tablet, R-3Normal  -     tiZANidine (ZANAFLEX) 2 MG tablet; Take 2 tablets by mouth every 6 hours as needed (muscle cramping right thigh pain), Disp-90 tablet, R-3Normal  -     ibuprofen (ADVIL;MOTRIN) 600 MG tablet; Take 1 tablet by mouth 3 times daily as needed for Pain, Disp-90 tablet, R-3Normal    Will follow up with Pain Management office on facilitating further interventions for pain relief. Return in about 3 months (around 7/14/2021) for Controlled Medication Monitoring, Is appropriate for VV. COMMUNICATION:       The best way to find yourself is to lose yourself in the service of others - [address] [place] [address]. [address] [place] [address]   [email or web address]  Office: [phone]     An electronic signature was used to authenticate this note.   Signed by JED Cameron on 4/14/2021 at 9:32 AM

## 2021-07-14 ENCOUNTER — TELEMEDICINE (OUTPATIENT)
Dept: FAMILY MEDICINE CLINIC | Age: 31
End: 2021-07-14
Payer: COMMERCIAL

## 2021-07-14 DIAGNOSIS — Z13.6 SCREENING FOR CARDIOVASCULAR, RESPIRATORY, AND GENITOURINARY DISEASES: ICD-10-CM

## 2021-07-14 DIAGNOSIS — R53.83 FATIGUE, UNSPECIFIED TYPE: ICD-10-CM

## 2021-07-14 DIAGNOSIS — Z13.83 SCREENING FOR CARDIOVASCULAR, RESPIRATORY, AND GENITOURINARY DISEASES: ICD-10-CM

## 2021-07-14 DIAGNOSIS — F41.9 ANXIETY: Primary | ICD-10-CM

## 2021-07-14 DIAGNOSIS — S39.92XD INJURY OF LOW BACK, SUBSEQUENT ENCOUNTER: ICD-10-CM

## 2021-07-14 DIAGNOSIS — Z13.89 SCREENING FOR CARDIOVASCULAR, RESPIRATORY, AND GENITOURINARY DISEASES: ICD-10-CM

## 2021-07-14 PROCEDURE — 99214 OFFICE O/P EST MOD 30 MIN: CPT | Performed by: NURSE PRACTITIONER

## 2021-07-14 PROCEDURE — G8427 DOCREV CUR MEDS BY ELIG CLIN: HCPCS | Performed by: NURSE PRACTITIONER

## 2021-07-14 PROCEDURE — 1036F TOBACCO NON-USER: CPT | Performed by: NURSE PRACTITIONER

## 2021-07-14 PROCEDURE — G8417 CALC BMI ABV UP PARAM F/U: HCPCS | Performed by: NURSE PRACTITIONER

## 2021-07-14 RX ORDER — IBUPROFEN 600 MG/1
600 TABLET ORAL 3 TIMES DAILY PRN
Qty: 90 TABLET | Refills: 3 | Status: SHIPPED | OUTPATIENT
Start: 2021-07-14 | End: 2022-03-21 | Stop reason: SDUPTHER

## 2021-07-14 RX ORDER — TIZANIDINE 2 MG/1
4 TABLET ORAL EVERY 6 HOURS PRN
Qty: 90 TABLET | Refills: 3 | Status: SHIPPED | OUTPATIENT
Start: 2021-07-14 | End: 2022-03-21 | Stop reason: SDUPTHER

## 2021-07-14 RX ORDER — TRAMADOL HYDROCHLORIDE 50 MG/1
50 TABLET ORAL 2 TIMES DAILY
Qty: 60 TABLET | Refills: 2 | Status: SHIPPED | OUTPATIENT
Start: 2021-07-14 | End: 2021-08-13

## 2021-07-14 SDOH — ECONOMIC STABILITY: FOOD INSECURITY: WITHIN THE PAST 12 MONTHS, THE FOOD YOU BOUGHT JUST DIDN'T LAST AND YOU DIDN'T HAVE MONEY TO GET MORE.: NEVER TRUE

## 2021-07-14 SDOH — ECONOMIC STABILITY: FOOD INSECURITY: WITHIN THE PAST 12 MONTHS, YOU WORRIED THAT YOUR FOOD WOULD RUN OUT BEFORE YOU GOT MONEY TO BUY MORE.: NEVER TRUE

## 2021-07-14 ASSESSMENT — ENCOUNTER SYMPTOMS: BACK PAIN: 1

## 2021-07-14 ASSESSMENT — SOCIAL DETERMINANTS OF HEALTH (SDOH): HOW HARD IS IT FOR YOU TO PAY FOR THE VERY BASICS LIKE FOOD, HOUSING, MEDICAL CARE, AND HEATING?: NOT HARD AT ALL

## 2021-07-14 NOTE — PATIENT INSTRUCTIONS
It was my pleasure to meet with you today. Please contact me with any questions or concerns, and please notify myself or our manager if there is anyway we can improve our service in your health care needs.  Below I have listed some instructions and information that pertain to today's visit.    -You have been advised to continue all current medication, otherwise not discussed in today's visit  -New medications and refills will been sent and made available at pharmacy or mail away  -Heathy daily diet to include low salt, low fat heart healthy and low carbohydrate, low sugar diabetic diet  -Drink 6-8 glasses of water daily  -Complete  fasting (8-12 hours - water, black coffee, plain tea ok) labs and/any other testing ordered prior to next scheduled followup

## 2021-07-14 NOTE — PROGRESS NOTES
Haresh Correa (:  1990) is a 28 y.o. male,Established patient, here for evaluation of the following chief complaint(s): Back Pain (Low 7/8, Constant, Bilateral)         ASSESSMENT/PLAN:  1. Anxiety  -     CBC; Future  -     TSH without Reflex; Future  2. Injury of low back, subsequent encounter  -     traMADol (ULTRAM) 50 MG tablet; Take 1 tablet by mouth 2 times daily for 30 days. Take lowest dose possible to manage pain, Disp-60 tablet, R-2Normal  -     tiZANidine (ZANAFLEX) 2 MG tablet; Take 2 tablets by mouth every 6 hours as needed (muscle cramping right thigh pain), Disp-90 tablet, R-3Normal  -     ibuprofen (ADVIL;MOTRIN) 600 MG tablet; Take 1 tablet by mouth 3 times daily as needed for Pain, Disp-90 tablet, R-3Normal  3. Screening for cardiovascular, respiratory, and genitourinary diseases  -     CBC; Future  -     Comprehensive Metabolic Panel, Fasting; Future  -     Lipid Panel; Future  4. Fatigue, unspecified type  -     CBC; Future      Return in about 6 months (around 2022) for Initial Medicare Wellness Visit, symptom check and lab review. SUBJECTIVE/OBJECTIVE:  Chronic back pain. Continues from previous complaint - was seen by pain management for injection - underwent one procedure. Insurance would not cover any further insurance. Patient has not returned to pain management for further workup and discussion of plan for continued intervention. Patient is stable with pain medication at this time Tramadol twice daily, tizanadine as needed, and ibuprofen. Patient encourage to schedule with pain management to determine further intervention. Review of Systems   Musculoskeletal: Positive for back pain. Gait problem: chronic        No flowsheet data found. Physical Exam  Constitutional:       Appearance: Normal appearance. He is well-developed. He is not ill-appearing. Eyes:      General:         Right eye: No discharge. Left eye: No discharge.       Extraocular Movements: Extraocular movements intact. Conjunctiva/sclera: Conjunctivae normal.   Neck:      Thyroid: No thyroid mass. Vascular: No JVD. Pulmonary:      Effort: Pulmonary effort is normal. No respiratory distress. Musculoskeletal:      Right shoulder: No deformity. Normal range of motion. Left shoulder: No deformity. Normal range of motion. Cervical back: Normal range of motion. Skin:     General: Skin is moist.      Coloration: Skin is not cyanotic or jaundiced. Findings: No rash. Neurological:      General: No focal deficit present. Mental Status: He is alert and oriented to person, place, and time. Gait: Gait is intact. Psychiatric:         Attention and Perception: Attention normal. He does not perceive auditory or visual hallucinations. Mood and Affect: Mood normal.         Speech: Speech normal.                   Angelica Reid was evaluated through a synchronous (real-time) audio-video encounter. The patient (or guardian if applicable) is aware that this is a billable service. Verbal consent to proceed has been obtained within the past 12 months. The visit was conducted pursuant to the emergency declaration under the [address] [place] [address], [address] [place] [address] authority and the [employer] Act. Patient identification was verified, and a caregiver was present when appropriate. The patient was located in a state where the provider was credentialed to provide care. An electronic signature was used to authenticate this note.     --IRA Grimes - CNP

## 2022-03-21 DIAGNOSIS — S39.92XD INJURY OF LOW BACK, SUBSEQUENT ENCOUNTER: ICD-10-CM

## 2022-03-22 RX ORDER — IBUPROFEN 600 MG/1
600 TABLET ORAL 3 TIMES DAILY PRN
Qty: 90 TABLET | Refills: 0 | Status: SHIPPED | OUTPATIENT
Start: 2022-03-22

## 2022-03-22 RX ORDER — TIZANIDINE 2 MG/1
4 TABLET ORAL EVERY 6 HOURS PRN
Qty: 90 TABLET | Refills: 0 | Status: SHIPPED | OUTPATIENT
Start: 2022-03-22

## 2022-03-22 NOTE — TELEPHONE ENCOUNTER
Last visit: 07/14/21  Last Med refill: ibuprofen 07/14/21, tizanidine 07/14/21  Does patient have enough medication for 72 hours: Yes    Next Visit Date:  No future appointments.     Health Maintenance   Topic Date Due    Hepatitis C screen  Never done    COVID-19 Vaccine (1) Never done    DTaP/Tdap/Td vaccine (6 - Tdap) 02/21/2002    Depression Screen  Never done    HIV screen  Never done    Flu vaccine (1) Never done    Hepatitis A vaccine  Aged Out    Hepatitis B vaccine  Aged Out    Hib vaccine  Aged Out    Meningococcal (ACWY) vaccine  Aged Out    Pneumococcal 0-64 years Vaccine  Aged Out    Varicella vaccine  Discontinued       No results found for: LABA1C          ( goal A1C is < 7)   No results found for: LABMICR  No results found for: LDLCHOLESTEROL, LDLCALC    (goal LDL is <100)   No results found for: AST, ALT, BUN  BP Readings from Last 3 Encounters:   04/14/21 120/80   02/25/21 126/82   02/02/21 122/80          (goal 120/80)    All Future Testing planned in [facility]  Lab Frequency Next Occurrence   CBC Once 11/14/2021   Comprehensive Metabolic Panel, Fasting Once 11/14/2021   Lipid Panel Once 11/14/2021   TSH without Reflex Once 11/14/2021               Patient Active Problem List:     Anxiety     Current smoker     No-show for appointment     Panic attack     Varicose veins of left lower extremity

## 2022-03-22 NOTE — TELEPHONE ENCOUNTER
Patient is past on labs - kidney function and liver function need to be evaluated for continued refills.  I have granted one more to allow him time to get blood work done and schedule his annual physical

## 2022-04-18 ENCOUNTER — TELEMEDICINE (OUTPATIENT)
Dept: FAMILY MEDICINE CLINIC | Age: 32
End: 2022-04-18
Payer: COMMERCIAL

## 2022-04-18 DIAGNOSIS — Z30.09 VASECTOMY EVALUATION: ICD-10-CM

## 2022-04-18 DIAGNOSIS — Z30.09 FAMILY PLANNING ADVICE: Primary | ICD-10-CM

## 2022-04-18 PROCEDURE — 99213 OFFICE O/P EST LOW 20 MIN: CPT | Performed by: NURSE PRACTITIONER

## 2022-04-18 PROCEDURE — G8427 DOCREV CUR MEDS BY ELIG CLIN: HCPCS | Performed by: NURSE PRACTITIONER

## 2022-04-18 ASSESSMENT — ENCOUNTER SYMPTOMS
DIARRHEA: 0
COUGH: 0
SHORTNESS OF BREATH: 0
CONSTIPATION: 0
SORE THROAT: 0
RHINORRHEA: 0

## 2022-04-18 ASSESSMENT — PATIENT HEALTH QUESTIONNAIRE - PHQ9
SUM OF ALL RESPONSES TO PHQ QUESTIONS 1-9: 0
SUM OF ALL RESPONSES TO PHQ QUESTIONS 1-9: 0
2. FEELING DOWN, DEPRESSED OR HOPELESS: 0
1. LITTLE INTEREST OR PLEASURE IN DOING THINGS: 0
SUM OF ALL RESPONSES TO PHQ QUESTIONS 1-9: 0
SUM OF ALL RESPONSES TO PHQ9 QUESTIONS 1 & 2: 0
SUM OF ALL RESPONSES TO PHQ QUESTIONS 1-9: 0

## 2022-04-18 NOTE — PROGRESS NOTES
soft.      Tenderness: There is no abdominal tenderness. Musculoskeletal:      Right lower leg: No edema. Left lower leg: No edema. Skin:     General: Skin is warm. Findings: No erythema, lesion or rash. Neurological:      Mental Status: He is alert. Psychiatric:         Mood and Affect: Mood normal.         Behavior: Behavior is cooperative. On this date 4/18/2022 I have spent 20 minutes reviewing previous notes, test results and face to face (virtual) with the patient discussing the diagnosis and importance of compliance with the treatment plan as well as documenting on the day of the visitGeri Fernández was evaluated through a synchronous (real-time) audio-video encounter. The patient (or guardian if applicable) is aware that this is a billable service, which includes applicable co-pays. This Virtual Visit was conducted with patient's (and/or legal guardian's) consent. The visit was conducted pursuant to the emergency declaration under the [address] [place] [address] [facility] authority and the [employer] General Act. Patient identification was verified, and a caregiver was present when appropriate. The patient was located at home in a state where the provider was licensed to provide care.        --IRA Jaramillo - CNP

## 2022-12-09 DIAGNOSIS — S39.92XD INJURY OF LOW BACK, SUBSEQUENT ENCOUNTER: ICD-10-CM

## 2022-12-09 NOTE — TELEPHONE ENCOUNTER
LOV 4/18/22  LRF 3/22/22     No future appt schedule. Health Maintenance   Topic Date Due    COVID-19 Vaccine (1) Never done    DTaP/Tdap/Td vaccine (6 - Tdap) 02/21/2002    HIV screen  Never done    Hepatitis C screen  Never done    Flu vaccine (1) Never done    Depression Screen  04/18/2023    Hepatitis A vaccine  Aged Out    Hib vaccine  Aged Out    Meningococcal (ACWY) vaccine  Aged Out    Pneumococcal 0-64 years Vaccine  Aged Out    Varicella vaccine  Discontinued             (applicable per patient's age: Cancer Screenings, Depression Screening, Fall Risk Screening, Immunizations)    No results found for: LABA1C, LABMICR, LDLCHOLESTEROL, LDLCALC, AST, ALT, BUN, CR   (goal A1C is < 7)   (goal LDL is <100) need 30-50% reduction from baseline     BP Readings from Last 3 Encounters:   04/14/21 120/80   02/25/21 126/82   02/02/21 122/80    (goal /80)      All Future Testing planned in [facility]:  Lab Frequency Next Occurrence       Next Visit Date:  No future appointments.          Patient Active Problem List:     Anxiety     Current smoker     No-show for appointment     Panic attack     Varicose veins of left lower extremity

## 2022-12-12 RX ORDER — IBUPROFEN 600 MG/1
600 TABLET ORAL 3 TIMES DAILY PRN
Qty: 90 TABLET | Refills: 0 | Status: SHIPPED | OUTPATIENT
Start: 2022-12-12

## 2022-12-12 RX ORDER — TIZANIDINE 2 MG/1
4 TABLET ORAL EVERY 6 HOURS PRN
Qty: 90 TABLET | Refills: 0 | Status: SHIPPED | OUTPATIENT
Start: 2022-12-12

## 2025-06-17 ENCOUNTER — OFFICE VISIT (OUTPATIENT)
Dept: FAMILY MEDICINE CLINIC | Age: 35
End: 2025-06-17
Payer: MEDICAID

## 2025-06-17 VITALS
OXYGEN SATURATION: 97 % | TEMPERATURE: 98.4 F | SYSTOLIC BLOOD PRESSURE: 132 MMHG | BODY MASS INDEX: 32.21 KG/M2 | HEART RATE: 77 BPM | WEIGHT: 225 LBS | DIASTOLIC BLOOD PRESSURE: 74 MMHG | HEIGHT: 70 IN

## 2025-06-17 DIAGNOSIS — Z76.89 ENCOUNTER TO ESTABLISH CARE WITH NEW PROVIDER: Primary | ICD-10-CM

## 2025-06-17 DIAGNOSIS — F41.9 ANXIETY: ICD-10-CM

## 2025-06-17 PROBLEM — Z91.199 NO-SHOW FOR APPOINTMENT: Status: RESOLVED | Noted: 2017-03-03 | Resolved: 2025-06-17

## 2025-06-17 PROCEDURE — 99204 OFFICE O/P NEW MOD 45 MIN: CPT

## 2025-06-17 RX ORDER — HYDROXYZINE HYDROCHLORIDE 25 MG/1
25 TABLET, FILM COATED ORAL NIGHTLY
Qty: 30 TABLET | Refills: 0 | Status: SHIPPED | OUTPATIENT
Start: 2025-06-17 | End: 2025-07-17

## 2025-06-17 RX ORDER — PROPRANOLOL HYDROCHLORIDE 60 MG/1
60 CAPSULE, EXTENDED RELEASE ORAL DAILY
Qty: 30 CAPSULE | Refills: 0 | Status: SHIPPED | OUTPATIENT
Start: 2025-06-17

## 2025-06-17 SDOH — ECONOMIC STABILITY: FOOD INSECURITY: WITHIN THE PAST 12 MONTHS, YOU WORRIED THAT YOUR FOOD WOULD RUN OUT BEFORE YOU GOT MONEY TO BUY MORE.: SOMETIMES TRUE

## 2025-06-17 SDOH — ECONOMIC STABILITY: FOOD INSECURITY: WITHIN THE PAST 12 MONTHS, THE FOOD YOU BOUGHT JUST DIDN'T LAST AND YOU DIDN'T HAVE MONEY TO GET MORE.: SOMETIMES TRUE

## 2025-06-17 ASSESSMENT — PATIENT HEALTH QUESTIONNAIRE - PHQ9
SUM OF ALL RESPONSES TO PHQ QUESTIONS 1-9: 2
1. LITTLE INTEREST OR PLEASURE IN DOING THINGS: SEVERAL DAYS
2. FEELING DOWN, DEPRESSED OR HOPELESS: SEVERAL DAYS
SUM OF ALL RESPONSES TO PHQ QUESTIONS 1-9: 2

## 2025-06-17 ASSESSMENT — ENCOUNTER SYMPTOMS
VOMITING: 0
NAUSEA: 0
ABDOMINAL PAIN: 0
SHORTNESS OF BREATH: 1

## 2025-06-17 NOTE — ASSESSMENT & PLAN NOTE
Chronic, not at goal (unstable), changes made today: Medication, medication adherence emphasized, and lifestyle modifications recommended  - Discussed medications chosen today as well as SSRI and SNRI options. Discussed that propranolol is more of a short term medication for relief of anxiety, and can be taken in the mornings or daily as needed for anxiety symptoms. Hydroxyzine should be taken at night and can cause drowsiness.   - Encouraged patient to consider CBT in the office for someone to discuss anxietys, worries, and fear with, as well as provide tools to help calm and prevent attacks from occurring, and recognize the signs and symptoms of attacks.   - Consider Prozac if needed/desired after patient has had time to decide if he would like a regular daily medication, as well as education on SSRIs.     Understanding and agreement was voiced with all above plans. All questions answered to satisfaction. Call office with any questions or new or worsening symptoms or concerns.     Orders:    propranolol (INDERAL LA) 60 MG extended release capsule; Take 1 capsule by mouth daily    hydrOXYzine HCl (ATARAX) 25 MG tablet; Take 1 tablet by mouth nightly    CBC with Auto Differential; Future    Comprehensive Metabolic Panel, Fasting; Future    Vitamin D 25 Hydroxy; Future

## 2025-06-17 NOTE — PROGRESS NOTES
Zheng Clarke (:  1990) is a 35 y.o. male,New patient, here for evaluation of the following chief complaint(s):  New Patient      Assessment & Plan  Encounter to establish care with new provider  - Complete labs  - Discussed and encouraged efforts at healthy lifestyle with nutritious diet and regular physical activity. This can also help to decrease anxiety symptoms as a healthy outlet.       Anxiety   Chronic, not at goal (unstable), changes made today: Medication, medication adherence emphasized, and lifestyle modifications recommended  - Discussed medications chosen today as well as SSRI and SNRI options. Discussed that propranolol is more of a short term medication for relief of anxiety, and can be taken in the mornings or daily as needed for anxiety symptoms. Hydroxyzine should be taken at night and can cause drowsiness.   - Encouraged patient to consider CBT in the office for someone to discuss anxietys, worries, and fear with, as well as provide tools to help calm and prevent attacks from occurring, and recognize the signs and symptoms of attacks.   - Consider Prozac if needed/desired after patient has had time to decide if he would like a regular daily medication, as well as education on SSRIs.     Understanding and agreement was voiced with all above plans. All questions answered to satisfaction. Call office with any questions or new or worsening symptoms or concerns.     Orders:    propranolol (INDERAL LA) 60 MG extended release capsule; Take 1 capsule by mouth daily    hydrOXYzine HCl (ATARAX) 25 MG tablet; Take 1 tablet by mouth nightly    CBC with Auto Differential; Future    Comprehensive Metabolic Panel, Fasting; Future    Vitamin D 25 Hydroxy; Future      Return in about 1 week (around 2025) for Anxiety .    Subjective       Surgical History:   Past Surgical History:   Procedure Laterality Date    ANTERIOR CRUCIATE LIGAMENT REPAIR Right     TONSILLECTOMY         Patient Active

## 2025-06-18 RX ORDER — IODIXANOL 320 MG/ML
INJECTION, SOLUTION INTRAVASCULAR
Status: DISPENSED
Start: 2025-06-18 | End: 2025-06-18

## 2025-06-18 RX ORDER — LIDOCAINE HYDROCHLORIDE 10 MG/ML
INJECTION, SOLUTION INFILTRATION; PERINEURAL
Status: DISPENSED
Start: 2025-06-18 | End: 2025-06-18

## 2025-06-20 ENCOUNTER — HOSPITAL ENCOUNTER (OUTPATIENT)
Age: 35
Setting detail: SPECIMEN
Discharge: HOME | End: 2025-06-20

## 2025-06-20 DIAGNOSIS — F41.9 ANXIETY: ICD-10-CM

## 2025-06-20 LAB
25(OH)D3 SERPL-MCNC: 27.5 NG/ML (ref 30–100)
ALBUMIN SERPL-MCNC: 4.6 G/DL (ref 3.5–5.2)
ALBUMIN/GLOB SERPL: 1.7 {RATIO} (ref 1–2.5)
ALP SERPL-CCNC: 59 U/L (ref 40–129)
ALT SERPL-CCNC: 47 U/L (ref 10–50)
ANION GAP SERPL CALCULATED.3IONS-SCNC: 14 MMOL/L (ref 9–16)
AST SERPL-CCNC: 45 U/L (ref 10–50)
BASOPHILS # BLD: 0.04 K/UL (ref 0–0.2)
BASOPHILS NFR BLD: 0 % (ref 0–2)
BILIRUB SERPL-MCNC: 1.2 MG/DL (ref 0–1.2)
BUN SERPL-MCNC: 15 MG/DL (ref 6–20)
CALCIUM SERPL-MCNC: 9.3 MG/DL (ref 8.6–10.4)
CHLORIDE SERPL-SCNC: 103 MMOL/L (ref 98–107)
CO2 SERPL-SCNC: 23 MMOL/L (ref 20–31)
CREAT SERPL-MCNC: 1.2 MG/DL (ref 0.7–1.2)
EOSINOPHIL # BLD: 0.12 K/UL (ref 0–0.44)
EOSINOPHILS RELATIVE PERCENT: 1 % (ref 1–4)
ERYTHROCYTE [DISTWIDTH] IN BLOOD BY AUTOMATED COUNT: 13 % (ref 11.8–14.4)
GFR, ESTIMATED: 81 ML/MIN/1.73M2
GLUCOSE P FAST SERPL-MCNC: 83 MG/DL (ref 74–99)
HCT VFR BLD AUTO: 45.8 % (ref 40.7–50.3)
HGB BLD-MCNC: 15.5 G/DL (ref 13–17)
IMM GRANULOCYTES # BLD AUTO: 0.03 K/UL (ref 0–0.3)
IMM GRANULOCYTES NFR BLD: 0 %
LYMPHOCYTES NFR BLD: 2.55 K/UL (ref 1.1–3.7)
LYMPHOCYTES RELATIVE PERCENT: 27 % (ref 24–43)
MCH RBC QN AUTO: 29 PG (ref 25.2–33.5)
MCHC RBC AUTO-ENTMCNC: 33.8 G/DL (ref 28.4–34.8)
MCV RBC AUTO: 85.8 FL (ref 82.6–102.9)
MONOCYTES NFR BLD: 0.87 K/UL (ref 0.1–1.2)
MONOCYTES NFR BLD: 9 % (ref 3–12)
NEUTROPHILS NFR BLD: 63 % (ref 36–65)
NEUTS SEG NFR BLD: 5.76 K/UL (ref 1.5–8.1)
NRBC BLD-RTO: 0 PER 100 WBC
PLATELET # BLD AUTO: 283 K/UL (ref 138–453)
PMV BLD AUTO: 10.6 FL (ref 8.1–13.5)
POTASSIUM SERPL-SCNC: 3.5 MMOL/L (ref 3.7–5.3)
PROT SERPL-MCNC: 7.3 G/DL (ref 6.6–8.7)
RBC # BLD AUTO: 5.34 M/UL (ref 4.21–5.77)
SODIUM SERPL-SCNC: 140 MMOL/L (ref 136–145)
WBC OTHER # BLD: 9.4 K/UL (ref 3.5–11.3)

## 2025-06-23 ENCOUNTER — RESULTS FOLLOW-UP (OUTPATIENT)
Dept: FAMILY MEDICINE CLINIC | Age: 35
End: 2025-06-23

## 2025-06-23 DIAGNOSIS — E55.9 VITAMIN D DEFICIENCY: Primary | ICD-10-CM

## 2025-06-23 RX ORDER — CHOLECALCIFEROL (VITAMIN D3) 25 MCG
1000 TABLET ORAL DAILY
Qty: 30 TABLET | Refills: 2 | Status: SHIPPED | OUTPATIENT
Start: 2025-06-23

## 2025-06-27 ENCOUNTER — OFFICE VISIT (OUTPATIENT)
Dept: FAMILY MEDICINE CLINIC | Age: 35
End: 2025-06-27
Payer: MEDICAID

## 2025-06-27 VITALS
HEART RATE: 53 BPM | TEMPERATURE: 98.1 F | HEIGHT: 70 IN | BODY MASS INDEX: 31.35 KG/M2 | DIASTOLIC BLOOD PRESSURE: 70 MMHG | WEIGHT: 219 LBS | SYSTOLIC BLOOD PRESSURE: 128 MMHG | OXYGEN SATURATION: 98 %

## 2025-06-27 DIAGNOSIS — F41.9 ANXIETY: Primary | ICD-10-CM

## 2025-06-27 DIAGNOSIS — Z13.31 POSITIVE SCREENING FOR DEPRESSION ON 9-ITEM PATIENT HEALTH QUESTIONNAIRE (PHQ-9): ICD-10-CM

## 2025-06-27 DIAGNOSIS — F32.A DEPRESSION, UNSPECIFIED DEPRESSION TYPE: ICD-10-CM

## 2025-06-27 PROCEDURE — 99213 OFFICE O/P EST LOW 20 MIN: CPT

## 2025-06-27 ASSESSMENT — PATIENT HEALTH QUESTIONNAIRE - PHQ9
10. IF YOU CHECKED OFF ANY PROBLEMS, HOW DIFFICULT HAVE THESE PROBLEMS MADE IT FOR YOU TO DO YOUR WORK, TAKE CARE OF THINGS AT HOME, OR GET ALONG WITH OTHER PEOPLE: VERY DIFFICULT
SUM OF ALL RESPONSES TO PHQ QUESTIONS 1-9: 25
5. POOR APPETITE OR OVEREATING: NEARLY EVERY DAY
8. MOVING OR SPEAKING SO SLOWLY THAT OTHER PEOPLE COULD HAVE NOTICED. OR THE OPPOSITE, BEING SO FIGETY OR RESTLESS THAT YOU HAVE BEEN MOVING AROUND A LOT MORE THAN USUAL: NEARLY EVERY DAY
1. LITTLE INTEREST OR PLEASURE IN DOING THINGS: NEARLY EVERY DAY
9. THOUGHTS THAT YOU WOULD BE BETTER OFF DEAD, OR OF HURTING YOURSELF: SEVERAL DAYS
7. TROUBLE CONCENTRATING ON THINGS, SUCH AS READING THE NEWSPAPER OR WATCHING TELEVISION: NEARLY EVERY DAY
SUM OF ALL RESPONSES TO PHQ QUESTIONS 1-9: 25
6. FEELING BAD ABOUT YOURSELF - OR THAT YOU ARE A FAILURE OR HAVE LET YOURSELF OR YOUR FAMILY DOWN: NEARLY EVERY DAY
3. TROUBLE FALLING OR STAYING ASLEEP: NEARLY EVERY DAY
SUM OF ALL RESPONSES TO PHQ QUESTIONS 1-9: 24
2. FEELING DOWN, DEPRESSED OR HOPELESS: NEARLY EVERY DAY
SUM OF ALL RESPONSES TO PHQ QUESTIONS 1-9: 25
4. FEELING TIRED OR HAVING LITTLE ENERGY: NEARLY EVERY DAY

## 2025-06-27 ASSESSMENT — ENCOUNTER SYMPTOMS
VOMITING: 0
ABDOMINAL PAIN: 1
NAUSEA: 1

## 2025-06-27 ASSESSMENT — COLUMBIA-SUICIDE SEVERITY RATING SCALE - C-SSRS
5. HAVE YOU STARTED TO WORK OUT OR WORKED OUT THE DETAILS OF HOW TO KILL YOURSELF? DO YOU INTEND TO CARRY OUT THIS PLAN?: NO
4. HAVE YOU HAD THESE THOUGHTS AND HAD SOME INTENTION OF ACTING ON THEM?: NO
3. HAVE YOU BEEN THINKING ABOUT HOW YOU MIGHT KILL YOURSELF?: YES
6. HAVE YOU EVER DONE ANYTHING, STARTED TO DO ANYTHING, OR PREPARED TO DO ANYTHING TO END YOUR LIFE?: NO
2. HAVE YOU ACTUALLY HAD ANY THOUGHTS OF KILLING YOURSELF?: YES
1. WITHIN THE PAST MONTH, HAVE YOU WISHED YOU WERE DEAD OR WISHED YOU COULD GO TO SLEEP AND NOT WAKE UP?: YES

## 2025-06-27 NOTE — ASSESSMENT & PLAN NOTE
Continue medications as prescribed.   Orders:    [facility] ([facility])    Amb External Referral To Behavioral Health

## 2025-06-27 NOTE — PROGRESS NOTES
Zheng Clarke (:  1990) is a 35 y.o. male,Established patient, here for evaluation of the following chief complaint(s):  Depression (Positive screen, has been taking medication, feels out of it today, would like to see counsler )      Assessment & Plan  Anxiety  Continue medications as prescribed.   Orders:    [facility] ([facility])    Amb External Referral To Behavioral Health    Depression, unspecified depression type  - Discussed possible need for daily SSRI or SNRI for depressive symptoms. Patient declined at that time, and would like to reassess need after CBT begins. Will discuss at follow up.   Orders:    [facility] ([facility])    Amb External Referral To Behavioral Health    Positive screening for depression on 9-item Patient Health Questionnaire (PHQ-9)  - Recent suicidal ideation without plan or attempt.   - Thorough discussion of local crisis hotlines, National suicide hotline, local facilities, and 911 as options for assistance if suicidal ideations were to return.  - Patient agreed to a safety plan of contacting either  or crisis hotline if suicidal ideations were to return. Crisis card, as well as local urgent care fliers given to patient.   - Closed loop scheduling with concelor confirmed before patient left appointment.        Return in about 2 weeks (around 2025).    Subjective       Surgical History:   Past Surgical History:   Procedure Laterality Date    ANTERIOR CRUCIATE LIGAMENT REPAIR Right     TONSILLECTOMY         Patient Active Problem List   Diagnosis    Anxiety    Current smoker    Panic attack    Varicose veins of left lower extremity        HPI    Patient presents for follow-up of depression and anxiety.  He has a positive PHQ-9 screen.  He did report suicidal ideations 2 days ago, but denies any thoughts of suicide or self-harm today.  When asked if there is a possible trigger or something increasing his suicidal thoughts,

## 2025-07-01 ENCOUNTER — TELEMEDICINE (OUTPATIENT)
Dept: BEHAVIORAL/MENTAL HEALTH CLINIC | Age: 35
End: 2025-07-01

## 2025-07-01 DIAGNOSIS — F41.1 GENERALIZED ANXIETY DISORDER WITH PANIC ATTACKS: Primary | ICD-10-CM

## 2025-07-01 DIAGNOSIS — F41.0 GENERALIZED ANXIETY DISORDER WITH PANIC ATTACKS: Primary | ICD-10-CM

## 2025-07-01 DIAGNOSIS — F33.2 MAJOR DEPRESSIVE DISORDER, RECURRENT, SEVERE WITHOUT PSYCHOTIC FEATURES (HCC): ICD-10-CM

## 2025-07-01 ASSESSMENT — PATIENT HEALTH QUESTIONNAIRE - PHQ9
10. IF YOU CHECKED OFF ANY PROBLEMS, HOW DIFFICULT HAVE THESE PROBLEMS MADE IT FOR YOU TO DO YOUR WORK, TAKE CARE OF THINGS AT HOME, OR GET ALONG WITH OTHER PEOPLE: SOMEWHAT DIFFICULT
1. LITTLE INTEREST OR PLEASURE IN DOING THINGS: MORE THAN HALF THE DAYS
3. TROUBLE FALLING OR STAYING ASLEEP: MORE THAN HALF THE DAYS
SUM OF ALL RESPONSES TO PHQ QUESTIONS 1-9: 21
6. FEELING BAD ABOUT YOURSELF - OR THAT YOU ARE A FAILURE OR HAVE LET YOURSELF OR YOUR FAMILY DOWN: NEARLY EVERY DAY
2. FEELING DOWN, DEPRESSED OR HOPELESS: MORE THAN HALF THE DAYS
3. TROUBLE FALLING OR STAYING ASLEEP: MORE THAN HALF THE DAYS
8. MOVING OR SPEAKING SO SLOWLY THAT OTHER PEOPLE COULD HAVE NOTICED. OR THE OPPOSITE - BEING SO FIDGETY OR RESTLESS THAT YOU HAVE BEEN MOVING AROUND A LOT MORE THAN USUAL: NEARLY EVERY DAY
10. IF YOU CHECKED OFF ANY PROBLEMS, HOW DIFFICULT HAVE THESE PROBLEMS MADE IT FOR YOU TO DO YOUR WORK, TAKE CARE OF THINGS AT HOME, OR GET ALONG WITH OTHER PEOPLE: SOMEWHAT DIFFICULT
4. FEELING TIRED OR HAVING LITTLE ENERGY: NEARLY EVERY DAY
SUM OF ALL RESPONSES TO PHQ QUESTIONS 1-9: 21
8. MOVING OR SPEAKING SO SLOWLY THAT OTHER PEOPLE COULD HAVE NOTICED. OR THE OPPOSITE, BEING SO FIGETY OR RESTLESS THAT YOU HAVE BEEN MOVING AROUND A LOT MORE THAN USUAL: NEARLY EVERY DAY
1. LITTLE INTEREST OR PLEASURE IN DOING THINGS: MORE THAN HALF THE DAYS
2. FEELING DOWN, DEPRESSED OR HOPELESS: MORE THAN HALF THE DAYS
5. POOR APPETITE OR OVEREATING: NEARLY EVERY DAY
SUM OF ALL RESPONSES TO PHQ QUESTIONS 1-9: 21
5. POOR APPETITE OR OVEREATING: NEARLY EVERY DAY
7. TROUBLE CONCENTRATING ON THINGS, SUCH AS READING THE NEWSPAPER OR WATCHING TELEVISION: MORE THAN HALF THE DAYS
7. TROUBLE CONCENTRATING ON THINGS, SUCH AS READING THE NEWSPAPER OR WATCHING TELEVISION: MORE THAN HALF THE DAYS
6. FEELING BAD ABOUT YOURSELF - OR THAT YOU ARE A FAILURE OR HAVE LET YOURSELF OR YOUR FAMILY DOWN: NEARLY EVERY DAY
4. FEELING TIRED OR HAVING LITTLE ENERGY: NEARLY EVERY DAY
SUM OF ALL RESPONSES TO PHQ QUESTIONS 1-9: 20
SUM OF ALL RESPONSES TO PHQ QUESTIONS 1-9: 21
9. THOUGHTS THAT YOU WOULD BE BETTER OFF DEAD, OR OF HURTING YOURSELF: SEVERAL DAYS
9. THOUGHTS THAT YOU WOULD BE BETTER OFF DEAD, OR OF HURTING YOURSELF: SEVERAL DAYS

## 2025-07-01 ASSESSMENT — ANXIETY QUESTIONNAIRES
2. NOT BEING ABLE TO STOP OR CONTROL WORRYING: NEARLY EVERY DAY
3. WORRYING TOO MUCH ABOUT DIFFERENT THINGS: MORE THAN HALF THE DAYS
5. BEING SO RESTLESS THAT IT IS HARD TO SIT STILL: MORE THAN HALF THE DAYS
6. BECOMING EASILY ANNOYED OR IRRITABLE: MORE THAN HALF THE DAYS
7. FEELING AFRAID AS IF SOMETHING AWFUL MIGHT HAPPEN: MORE THAN HALF THE DAYS
3. WORRYING TOO MUCH ABOUT DIFFERENT THINGS: MORE THAN HALF THE DAYS
4. TROUBLE RELAXING: MORE THAN HALF THE DAYS
2. NOT BEING ABLE TO STOP OR CONTROL WORRYING: NEARLY EVERY DAY
7. FEELING AFRAID AS IF SOMETHING AWFUL MIGHT HAPPEN: MORE THAN HALF THE DAYS
6. BECOMING EASILY ANNOYED OR IRRITABLE: MORE THAN HALF THE DAYS
4. TROUBLE RELAXING: MORE THAN HALF THE DAYS
1. FEELING NERVOUS, ANXIOUS, OR ON EDGE: NEARLY EVERY DAY
IF YOU CHECKED OFF ANY PROBLEMS ON THIS QUESTIONNAIRE, HOW DIFFICULT HAVE THESE PROBLEMS MADE IT FOR YOU TO DO YOUR WORK, TAKE CARE OF THINGS AT HOME, OR GET ALONG WITH OTHER PEOPLE: SOMEWHAT DIFFICULT
GAD7 TOTAL SCORE: 16
5. BEING SO RESTLESS THAT IT IS HARD TO SIT STILL: MORE THAN HALF THE DAYS
IF YOU CHECKED OFF ANY PROBLEMS ON THIS QUESTIONNAIRE, HOW DIFFICULT HAVE THESE PROBLEMS MADE IT FOR YOU TO DO YOUR WORK, TAKE CARE OF THINGS AT HOME, OR GET ALONG WITH OTHER PEOPLE: SOMEWHAT DIFFICULT
1. FEELING NERVOUS, ANXIOUS, OR ON EDGE: NEARLY EVERY DAY

## 2025-07-01 ASSESSMENT — COLUMBIA-SUICIDE SEVERITY RATING SCALE - C-SSRS
6. IN YOUR LIFETIME, HAVE YOU EVER DONE ANYTHING, STARTED TO DO ANYTHING, OR PREPARED TO DO ANYTHING TO END YOUR LIFE?: NO
3. IN THE PAST MONTH, HAVE YOU BEEN THINKING ABOUT HOW YOU MIGHT KILL YOURSELF?: NO
2. IN THE PAST MONTH, HAVE YOU ACTUALLY HAD ANY THOUGHTS OF KILLING YOURSELF?: YES
5. IN THE PAST MONTH, HAVE YOU STARTED TO WORK OUT OR WORKED OUT THE DETAILS OF HOW TO KILL YOURSELF? DO YOU INTEND TO CARRY OUT THIS PLAN?: NO
1. IN THE PAST MONTH, HAVE YOU WISHED YOU WERE DEAD OR WISHED YOU COULD GO TO SLEEP AND NOT WAKE UP?: YES
4. IN THE PAST MONTH, HAVE YOU HAD THESE THOUGHTS AND HAD SOME INTENTION OF ACTING ON THEM?: NO

## 2025-07-01 NOTE — PROGRESS NOTES
ADULT BEHAVIORAL HEALTH ASSESSMENT  BERTO BLACKWELL, [facility]       Visit Date: 7/1/2025   Time of appointment: 2:26pm - 3:22pm  Time spent with Patient: 56 minutes.   This is patient's first appointment.    Reason for Consult: Initial evaluation.      Referring Provider/PCP:    No ref. provider found  Mckayla Henley PA-C      Patient provided informed consent for the behavioral health program. Discussed with patient model of service to include the limits of confidentiality (i.e. abuse reporting, suicide intervention, etc.) and short-term intervention focused approach. Patient indicated understanding.     Patient was seen via telehealth using [employer] (interactive audio/video). The patient was located in his parked vehicle in [place], and the clinician was located in [place], [place]. Patient verbally consented to telehealth and indicated that they were in a private location to accommodate confidentiality.     PRESENTING PROBLEM AND HISTORY  Zheng is a 35 y.o. male who presents for new evaluation and treatment of anxiety, depression, and marital discord. He has the following symptoms: depressed mood, anhedonia, decreased appetite, decreased sleep, fatigue/lack of energy, lack of motivation, low self-esteem, feelings of worthlessness, recurrent thoughts of death, suicidal thoughts with specific plan, anger/irritability, feeling nervous, anxious, or on edge, racing worry thoughts, excessive worry about a number of events or activities , inability to stop or control worry, unexpected panic attacks, feeling afraid something awful might happen, difficulty with attention/concentration, and relationship issues. Onset of symptoms was approximately several years ago. Symptoms have been gradually worsening since that time. Patient reported that his symptoms of anxiety have been consistently present and that his symptoms of depression are intermittent. He denies current homicidal ideation. Patient reported that in the last

## 2025-07-09 ENCOUNTER — TELEPHONE (OUTPATIENT)
Dept: BEHAVIORAL/MENTAL HEALTH CLINIC | Age: 35
End: 2025-07-09

## 2025-07-09 NOTE — TELEPHONE ENCOUNTER
Clinician attempted to contact patient after receiving [employer] message stating that he was suicidal last night and had come up with a plan to kill himself, but did not follow through. Clinician was unable to reach patient and unable to leave a voicemail. Clinician will attempt to contact patient via [employer] messages and call again later today.

## 2025-07-09 NOTE — TELEPHONE ENCOUNTER
Clinician attempted to contact patient a second time to check in. Clinician was unable to reach him and unable to leave a voicemail. Clinician will contact patient via [employer] a second time and will attempt to call again before 5:00pm.

## 2025-07-09 NOTE — TELEPHONE ENCOUNTER
Clinician contacted patient a third time via telephone and was able to reach him. Patient stated that yesterday his wife informed him of her decision to end their marriage and that he became distressed. Patient stated that he had thoughts of killing himself and that his plan was to wear heavy clothes so that he would overheat on his walk and to walk high up on a road so that he may pass out or fall into oncoming traffic. Patient stated that he went on the walk and then changed his mind and called his wife to pick him up. Patient stated that currently he is having thoughts of death being easier but denied thoughts of wanting to kill himself. Patient denied having a current suicidal plan. Patient stated that he could promise to keep himself safe until his appointment with clinician on Friday. Patient agreed to call crisis services, call 911, call his sister, or go to the hospital if his risk of harm towards self increases.

## 2025-07-11 ENCOUNTER — TELEMEDICINE (OUTPATIENT)
Dept: BEHAVIORAL/MENTAL HEALTH CLINIC | Age: 35
End: 2025-07-11

## 2025-07-11 DIAGNOSIS — F33.2 MAJOR DEPRESSIVE DISORDER, RECURRENT, SEVERE WITHOUT PSYCHOTIC FEATURES (HCC): Primary | ICD-10-CM

## 2025-07-11 DIAGNOSIS — F41.0 GENERALIZED ANXIETY DISORDER WITH PANIC ATTACKS: ICD-10-CM

## 2025-07-11 DIAGNOSIS — F41.1 GENERALIZED ANXIETY DISORDER WITH PANIC ATTACKS: ICD-10-CM

## 2025-07-11 ASSESSMENT — PATIENT HEALTH QUESTIONNAIRE - PHQ9
SUM OF ALL RESPONSES TO PHQ QUESTIONS 1-9: 23
4. FEELING TIRED OR HAVING LITTLE ENERGY: MORE THAN HALF THE DAYS
5. POOR APPETITE OR OVEREATING: MORE THAN HALF THE DAYS
8. MOVING OR SPEAKING SO SLOWLY THAT OTHER PEOPLE COULD HAVE NOTICED. OR THE OPPOSITE - BEING SO FIDGETY OR RESTLESS THAT YOU HAVE BEEN MOVING AROUND A LOT MORE THAN USUAL: NEARLY EVERY DAY
SUM OF ALL RESPONSES TO PHQ QUESTIONS 1-9: 23
2. FEELING DOWN, DEPRESSED OR HOPELESS: NEARLY EVERY DAY
2. FEELING DOWN, DEPRESSED OR HOPELESS: NEARLY EVERY DAY
1. LITTLE INTEREST OR PLEASURE IN DOING THINGS: NEARLY EVERY DAY
3. TROUBLE FALLING OR STAYING ASLEEP: NEARLY EVERY DAY
3. TROUBLE FALLING OR STAYING ASLEEP: NEARLY EVERY DAY
9. THOUGHTS THAT YOU WOULD BE BETTER OFF DEAD, OR OF HURTING YOURSELF: SEVERAL DAYS
7. TROUBLE CONCENTRATING ON THINGS, SUCH AS READING THE NEWSPAPER OR WATCHING TELEVISION: NEARLY EVERY DAY
10. IF YOU CHECKED OFF ANY PROBLEMS, HOW DIFFICULT HAVE THESE PROBLEMS MADE IT FOR YOU TO DO YOUR WORK, TAKE CARE OF THINGS AT HOME, OR GET ALONG WITH OTHER PEOPLE: VERY DIFFICULT
6. FEELING BAD ABOUT YOURSELF - OR THAT YOU ARE A FAILURE OR HAVE LET YOURSELF OR YOUR FAMILY DOWN: NEARLY EVERY DAY
5. POOR APPETITE OR OVEREATING: MORE THAN HALF THE DAYS
SUM OF ALL RESPONSES TO PHQ QUESTIONS 1-9: 23
10. IF YOU CHECKED OFF ANY PROBLEMS, HOW DIFFICULT HAVE THESE PROBLEMS MADE IT FOR YOU TO DO YOUR WORK, TAKE CARE OF THINGS AT HOME, OR GET ALONG WITH OTHER PEOPLE: VERY DIFFICULT
SUM OF ALL RESPONSES TO PHQ QUESTIONS 1-9: 23
SUM OF ALL RESPONSES TO PHQ QUESTIONS 1-9: 22
4. FEELING TIRED OR HAVING LITTLE ENERGY: MORE THAN HALF THE DAYS
6. FEELING BAD ABOUT YOURSELF - OR THAT YOU ARE A FAILURE OR HAVE LET YOURSELF OR YOUR FAMILY DOWN: NEARLY EVERY DAY
7. TROUBLE CONCENTRATING ON THINGS, SUCH AS READING THE NEWSPAPER OR WATCHING TELEVISION: NEARLY EVERY DAY
9. THOUGHTS THAT YOU WOULD BE BETTER OFF DEAD, OR OF HURTING YOURSELF: SEVERAL DAYS
1. LITTLE INTEREST OR PLEASURE IN DOING THINGS: NEARLY EVERY DAY
8. MOVING OR SPEAKING SO SLOWLY THAT OTHER PEOPLE COULD HAVE NOTICED. OR THE OPPOSITE, BEING SO FIGETY OR RESTLESS THAT YOU HAVE BEEN MOVING AROUND A LOT MORE THAN USUAL: NEARLY EVERY DAY

## 2025-07-11 ASSESSMENT — ANXIETY QUESTIONNAIRES
6. BECOMING EASILY ANNOYED OR IRRITABLE: NEARLY EVERY DAY
7. FEELING AFRAID AS IF SOMETHING AWFUL MIGHT HAPPEN: NEARLY EVERY DAY
IF YOU CHECKED OFF ANY PROBLEMS ON THIS QUESTIONNAIRE, HOW DIFFICULT HAVE THESE PROBLEMS MADE IT FOR YOU TO DO YOUR WORK, TAKE CARE OF THINGS AT HOME, OR GET ALONG WITH OTHER PEOPLE: EXTREMELY DIFFICULT
IF YOU CHECKED OFF ANY PROBLEMS ON THIS QUESTIONNAIRE, HOW DIFFICULT HAVE THESE PROBLEMS MADE IT FOR YOU TO DO YOUR WORK, TAKE CARE OF THINGS AT HOME, OR GET ALONG WITH OTHER PEOPLE: EXTREMELY DIFFICULT
GAD7 TOTAL SCORE: 21
6. BECOMING EASILY ANNOYED OR IRRITABLE: NEARLY EVERY DAY
5. BEING SO RESTLESS THAT IT IS HARD TO SIT STILL: NEARLY EVERY DAY
3. WORRYING TOO MUCH ABOUT DIFFERENT THINGS: NEARLY EVERY DAY
4. TROUBLE RELAXING: NEARLY EVERY DAY
1. FEELING NERVOUS, ANXIOUS, OR ON EDGE: NEARLY EVERY DAY
2. NOT BEING ABLE TO STOP OR CONTROL WORRYING: NEARLY EVERY DAY
1. FEELING NERVOUS, ANXIOUS, OR ON EDGE: NEARLY EVERY DAY
2. NOT BEING ABLE TO STOP OR CONTROL WORRYING: NEARLY EVERY DAY
7. FEELING AFRAID AS IF SOMETHING AWFUL MIGHT HAPPEN: NEARLY EVERY DAY
5. BEING SO RESTLESS THAT IT IS HARD TO SIT STILL: NEARLY EVERY DAY
3. WORRYING TOO MUCH ABOUT DIFFERENT THINGS: NEARLY EVERY DAY
4. TROUBLE RELAXING: NEARLY EVERY DAY

## 2025-07-11 ASSESSMENT — COLUMBIA-SUICIDE SEVERITY RATING SCALE - C-SSRS
1. IN THE PAST MONTH, HAVE YOU WISHED YOU WERE DEAD OR WISHED YOU COULD GO TO SLEEP AND NOT WAKE UP?: YES
5. IN THE PAST MONTH, HAVE YOU STARTED TO WORK OUT OR WORKED OUT THE DETAILS OF HOW TO KILL YOURSELF? DO YOU INTEND TO CARRY OUT THIS PLAN?: NO
3. IN THE PAST MONTH, HAVE YOU BEEN THINKING ABOUT HOW YOU MIGHT KILL YOURSELF?: YES
6. IN YOUR LIFETIME, HAVE YOU EVER DONE ANYTHING, STARTED TO DO ANYTHING, OR PREPARED TO DO ANYTHING TO END YOUR LIFE?: YES
4. IN THE PAST MONTH, HAVE YOU HAD THESE THOUGHTS AND HAD SOME INTENTION OF ACTING ON THEM?: YES
2. IN THE PAST MONTH, HAVE YOU ACTUALLY HAD ANY THOUGHTS OF KILLING YOURSELF?: YES
7. DID THIS OCCUR IN THE LAST THREE MONTHS: YES

## 2025-07-11 NOTE — PROGRESS NOTES
ADULT BEHAVIORAL HEALTH FOLLOW UP  BERTO BLACKWELL, [facility]       Visit Date: 7/11/2025   Time of appointment: 4:20pm - 5:02pm  Time spent with Patient: 42 minutes.   This is patient's second appointment.    Reason for Consult: Depression and anxiety.     Referring Provider/PCP:    No ref. provider found  Mckayla Henley PA-C      Patient provided informed consent for the behavioral health program. Discussed with patient model of service to include the limits of confidentiality (i.e. abuse reporting, suicide intervention, etc.) and short-term intervention focused approach. Patient indicated understanding.    Patient was seen via telehealth using [employer] (interactive audio/video). The patient was located in their home (address on file) in [place] and the clinician was located in [place], [place]. Patient verbally consented to telehealth and indicated that they were in a private location to accommodate confidentiality.     ALONSO Calzada is a 35 y.o. male who presents for follow up of depression and anxiety. Clinician reviewed patient's PHQ-9 and C-SSRS scores and reevaluated patient's risk of harm towards self after yesterday's attempt. Patient reported that yesterday he found out that his wife wants to separate and this led to suicidal ideation. Patient stated that he had come up with a plan to dress in overly warm clothing and go for a walk until he passed out in the road and a car hit him. Patient reported that he had been to scared to do anything directly to himself. Patient discussed that he changed his mind during the walk and called his wife for help. Patient reported that afterwards he found out that his son had been scared and was looking for him during that time and that this scared patient. Patient reported that he still has thoughts of his family being better off without him and of death being easier, but stated that he has not had thoughts of killing himself and that he does not want to put his

## 2025-07-15 ENCOUNTER — OFFICE VISIT (OUTPATIENT)
Dept: FAMILY MEDICINE CLINIC | Age: 35
End: 2025-07-15
Payer: MEDICAID

## 2025-07-15 VITALS
SYSTOLIC BLOOD PRESSURE: 124 MMHG | OXYGEN SATURATION: 96 % | DIASTOLIC BLOOD PRESSURE: 74 MMHG | HEART RATE: 81 BPM | BODY MASS INDEX: 32.8 KG/M2 | TEMPERATURE: 98.5 F | WEIGHT: 209 LBS | HEIGHT: 67 IN

## 2025-07-15 DIAGNOSIS — F32.A DEPRESSION, UNSPECIFIED DEPRESSION TYPE: ICD-10-CM

## 2025-07-15 DIAGNOSIS — F41.9 ANXIETY: Primary | ICD-10-CM

## 2025-07-15 PROCEDURE — 99213 OFFICE O/P EST LOW 20 MIN: CPT

## 2025-07-15 PROCEDURE — 99490 CHRNC CARE MGMT STAFF 1ST 20: CPT

## 2025-07-15 RX ORDER — FLUOXETINE 10 MG/1
10 CAPSULE ORAL DAILY
Qty: 30 CAPSULE | Refills: 0 | Status: SHIPPED | OUTPATIENT
Start: 2025-07-15

## 2025-07-15 ASSESSMENT — PATIENT HEALTH QUESTIONNAIRE - PHQ9
2. FEELING DOWN, DEPRESSED OR HOPELESS: NEARLY EVERY DAY
9. THOUGHTS THAT YOU WOULD BE BETTER OFF DEAD, OR OF HURTING YOURSELF: NOT AT ALL
4. FEELING TIRED OR HAVING LITTLE ENERGY: NEARLY EVERY DAY
3. TROUBLE FALLING OR STAYING ASLEEP: NEARLY EVERY DAY
8. MOVING OR SPEAKING SO SLOWLY THAT OTHER PEOPLE COULD HAVE NOTICED. OR THE OPPOSITE, BEING SO FIGETY OR RESTLESS THAT YOU HAVE BEEN MOVING AROUND A LOT MORE THAN USUAL: NEARLY EVERY DAY
SUM OF ALL RESPONSES TO PHQ QUESTIONS 1-9: 24
SUM OF ALL RESPONSES TO PHQ QUESTIONS 1-9: 24
7. TROUBLE CONCENTRATING ON THINGS, SUCH AS READING THE NEWSPAPER OR WATCHING TELEVISION: NEARLY EVERY DAY
10. IF YOU CHECKED OFF ANY PROBLEMS, HOW DIFFICULT HAVE THESE PROBLEMS MADE IT FOR YOU TO DO YOUR WORK, TAKE CARE OF THINGS AT HOME, OR GET ALONG WITH OTHER PEOPLE: VERY DIFFICULT
6. FEELING BAD ABOUT YOURSELF - OR THAT YOU ARE A FAILURE OR HAVE LET YOURSELF OR YOUR FAMILY DOWN: NEARLY EVERY DAY
1. LITTLE INTEREST OR PLEASURE IN DOING THINGS: NEARLY EVERY DAY
5. POOR APPETITE OR OVEREATING: NEARLY EVERY DAY
SUM OF ALL RESPONSES TO PHQ QUESTIONS 1-9: 24
SUM OF ALL RESPONSES TO PHQ QUESTIONS 1-9: 24

## 2025-07-15 ASSESSMENT — COLUMBIA-SUICIDE SEVERITY RATING SCALE - C-SSRS
4. HAVE YOU HAD THESE THOUGHTS AND HAD SOME INTENTION OF ACTING ON THEM?: NO
2. HAVE YOU ACTUALLY HAD ANY THOUGHTS OF KILLING YOURSELF?: YES
1. WITHIN THE PAST MONTH, HAVE YOU WISHED YOU WERE DEAD OR WISHED YOU COULD GO TO SLEEP AND NOT WAKE UP?: YES
6. HAVE YOU EVER DONE ANYTHING, STARTED TO DO ANYTHING, OR PREPARED TO DO ANYTHING TO END YOUR LIFE?: NO
5. HAVE YOU STARTED TO WORK OUT OR WORKED OUT THE DETAILS OF HOW TO KILL YOURSELF? DO YOU INTEND TO CARRY OUT THIS PLAN?: NO
3. HAVE YOU BEEN THINKING ABOUT HOW YOU MIGHT KILL YOURSELF?: YES

## 2025-07-15 NOTE — PROGRESS NOTES
Zheng Clarke (:  1990) is a 35 y.o. male,Established patient, here for evaluation of the following chief complaint(s):  Follow-up (Doesn't feel like the medication is helping anymore )      Assessment & Plan  Anxiety  Orders:    FLUoxetine (PROZAC) 10 MG capsule; Take 1 capsule by mouth daily    Depression, unspecified depression type  Orders:    FLUoxetine (PROZAC) 10 MG capsule; Take 1 capsule by mouth daily    Discussed possible medications, SSRIs and they actions, risks, and benefits. Patient agreeable to medication.   Discussed possible drug interactions from medications and not mixing medications without checking their interactions first.   Reviewed possible side effects of medications and the need for close monitoring of physical and emotional symptoms when taking medication.   Reviewed safety plan, and he has a trusted person that he could call if he was having thoughts of suicide or self harm again, as well as BH numbers, Crisis numbers, Office number, and MyChart.   Prozac handout attached to chart.   Discussed starting dose, and room to increase as needed, as well as taking the medication daily and not stopping without warning and tapering down. Discussed that this medication does not need to be foreever, but due to worsening symptoms may be needed to treat anxiety and depressive symtpoms. Patient agreeable to medication and expressed understanding.   Discontinue propranolol.   Continue Atarax as needed for anxiety and sleep.       Return if symptoms worsen or fail to improve.    Subjective       Surgical History:   Past Surgical History:   Procedure Laterality Date    ANTERIOR CRUCIATE LIGAMENT REPAIR Right     TONSILLECTOMY         Patient Active Problem List   Diagnosis    Anxiety    Current smoker    Panic attack    Varicose veins of left lower extremity        HPI    Therapist notified writer of recent suicide attempt 25. Patient made a suicide plan, but decided not to follow through

## 2025-07-18 ENCOUNTER — TELEMEDICINE (OUTPATIENT)
Dept: BEHAVIORAL/MENTAL HEALTH CLINIC | Age: 35
End: 2025-07-18
Payer: MEDICAID

## 2025-07-18 DIAGNOSIS — F33.2 MAJOR DEPRESSIVE DISORDER, RECURRENT, SEVERE WITHOUT PSYCHOTIC FEATURES (HCC): ICD-10-CM

## 2025-07-18 DIAGNOSIS — F41.1 GENERALIZED ANXIETY DISORDER WITH PANIC ATTACKS: Primary | ICD-10-CM

## 2025-07-18 DIAGNOSIS — F41.0 GENERALIZED ANXIETY DISORDER WITH PANIC ATTACKS: Primary | ICD-10-CM

## 2025-07-18 PROCEDURE — 90834 PSYTX W PT 45 MINUTES: CPT | Performed by: COUNSELOR

## 2025-07-18 ASSESSMENT — ANXIETY QUESTIONNAIRES
2. NOT BEING ABLE TO STOP OR CONTROL WORRYING: NEARLY EVERY DAY
6. BECOMING EASILY ANNOYED OR IRRITABLE: SEVERAL DAYS
GAD7 TOTAL SCORE: 14
5. BEING SO RESTLESS THAT IT IS HARD TO SIT STILL: MORE THAN HALF THE DAYS
5. BEING SO RESTLESS THAT IT IS HARD TO SIT STILL: MORE THAN HALF THE DAYS
3. WORRYING TOO MUCH ABOUT DIFFERENT THINGS: MORE THAN HALF THE DAYS
2. NOT BEING ABLE TO STOP OR CONTROL WORRYING: NEARLY EVERY DAY
4. TROUBLE RELAXING: MORE THAN HALF THE DAYS
4. TROUBLE RELAXING: MORE THAN HALF THE DAYS
6. BECOMING EASILY ANNOYED OR IRRITABLE: SEVERAL DAYS
1. FEELING NERVOUS, ANXIOUS, OR ON EDGE: NEARLY EVERY DAY
7. FEELING AFRAID AS IF SOMETHING AWFUL MIGHT HAPPEN: SEVERAL DAYS
3. WORRYING TOO MUCH ABOUT DIFFERENT THINGS: MORE THAN HALF THE DAYS
7. FEELING AFRAID AS IF SOMETHING AWFUL MIGHT HAPPEN: SEVERAL DAYS
1. FEELING NERVOUS, ANXIOUS, OR ON EDGE: NEARLY EVERY DAY
IF YOU CHECKED OFF ANY PROBLEMS ON THIS QUESTIONNAIRE, HOW DIFFICULT HAVE THESE PROBLEMS MADE IT FOR YOU TO DO YOUR WORK, TAKE CARE OF THINGS AT HOME, OR GET ALONG WITH OTHER PEOPLE: SOMEWHAT DIFFICULT
IF YOU CHECKED OFF ANY PROBLEMS ON THIS QUESTIONNAIRE, HOW DIFFICULT HAVE THESE PROBLEMS MADE IT FOR YOU TO DO YOUR WORK, TAKE CARE OF THINGS AT HOME, OR GET ALONG WITH OTHER PEOPLE: SOMEWHAT DIFFICULT

## 2025-07-18 ASSESSMENT — PATIENT HEALTH QUESTIONNAIRE - PHQ9
SUM OF ALL RESPONSES TO PHQ QUESTIONS 1-9: 16
2. FEELING DOWN, DEPRESSED OR HOPELESS: MORE THAN HALF THE DAYS
7. TROUBLE CONCENTRATING ON THINGS, SUCH AS READING THE NEWSPAPER OR WATCHING TELEVISION: MORE THAN HALF THE DAYS
3. TROUBLE FALLING OR STAYING ASLEEP: MORE THAN HALF THE DAYS
6. FEELING BAD ABOUT YOURSELF - OR THAT YOU ARE A FAILURE OR HAVE LET YOURSELF OR YOUR FAMILY DOWN: MORE THAN HALF THE DAYS
4. FEELING TIRED OR HAVING LITTLE ENERGY: SEVERAL DAYS
8. MOVING OR SPEAKING SO SLOWLY THAT OTHER PEOPLE COULD HAVE NOTICED. OR THE OPPOSITE - BEING SO FIDGETY OR RESTLESS THAT YOU HAVE BEEN MOVING AROUND A LOT MORE THAN USUAL: MORE THAN HALF THE DAYS
7. TROUBLE CONCENTRATING ON THINGS, SUCH AS READING THE NEWSPAPER OR WATCHING TELEVISION: MORE THAN HALF THE DAYS
5. POOR APPETITE OR OVEREATING: NEARLY EVERY DAY
1. LITTLE INTEREST OR PLEASURE IN DOING THINGS: MORE THAN HALF THE DAYS
8. MOVING OR SPEAKING SO SLOWLY THAT OTHER PEOPLE COULD HAVE NOTICED. OR THE OPPOSITE, BEING SO FIGETY OR RESTLESS THAT YOU HAVE BEEN MOVING AROUND A LOT MORE THAN USUAL: MORE THAN HALF THE DAYS
2. FEELING DOWN, DEPRESSED OR HOPELESS: MORE THAN HALF THE DAYS
4. FEELING TIRED OR HAVING LITTLE ENERGY: SEVERAL DAYS
9. THOUGHTS THAT YOU WOULD BE BETTER OFF DEAD, OR OF HURTING YOURSELF: NOT AT ALL
3. TROUBLE FALLING OR STAYING ASLEEP: MORE THAN HALF THE DAYS
10. IF YOU CHECKED OFF ANY PROBLEMS, HOW DIFFICULT HAVE THESE PROBLEMS MADE IT FOR YOU TO DO YOUR WORK, TAKE CARE OF THINGS AT HOME, OR GET ALONG WITH OTHER PEOPLE: SOMEWHAT DIFFICULT
1. LITTLE INTEREST OR PLEASURE IN DOING THINGS: MORE THAN HALF THE DAYS
SUM OF ALL RESPONSES TO PHQ QUESTIONS 1-9: 16
6. FEELING BAD ABOUT YOURSELF - OR THAT YOU ARE A FAILURE OR HAVE LET YOURSELF OR YOUR FAMILY DOWN: MORE THAN HALF THE DAYS
9. THOUGHTS THAT YOU WOULD BE BETTER OFF DEAD, OR OF HURTING YOURSELF: NOT AT ALL
5. POOR APPETITE OR OVEREATING: NEARLY EVERY DAY
10. IF YOU CHECKED OFF ANY PROBLEMS, HOW DIFFICULT HAVE THESE PROBLEMS MADE IT FOR YOU TO DO YOUR WORK, TAKE CARE OF THINGS AT HOME, OR GET ALONG WITH OTHER PEOPLE: SOMEWHAT DIFFICULT
SUM OF ALL RESPONSES TO PHQ QUESTIONS 1-9: 16

## 2025-07-18 NOTE — PROGRESS NOTES
ADULT BEHAVIORAL HEALTH FOLLOW UP  BERTO BLACKWELL, [facility]       Visit Date: 7/18/2025   Time of appointment: 4:03pm - 4:55pm  Time spent with Patient: 52 minutes.   This is patient's third appointment.    Reason for Consult: Depression and anxiety.     Referring Provider/PCP:    No ref. provider found  Mckayla Henley, [facility]      Patient provided informed consent for the behavioral health program. Discussed with patient model of service to include the limits of confidentiality (i.e. abuse reporting, suicide intervention, etc.) and short-term intervention focused approach. Patient indicated understanding.    Patient was seen via telehealth using [employer] (interactive audio/video). The patient was located in their home (address on file) in [place] and the clinician was located in [place], [place]. Patient verbally consented to telehealth and indicated that they were in a private location to accommodate confidentiality.     ALONSO Calzada is a 35 y.o. male who presents for follow up of depression and anxiety. Patient reported that his mood has been slowly improving and his doing better at managing his anxiety. Patient stated that he has not had any thoughts of suicide since his last appointment and that he has been working on focusing on himself and his children. Patient discussed his relationship and reported that he has something feelings of confusion, but is doing his best to keep things calm as him and his partner had agreed until after his stepson's graduation party.     Previous Recommendations: Patient will attend his next scheduled therapy appointment in one week. Patient will follow safety plan.         MENTAL STATUS EXAM  Mood was depressed with depressed affect.   Suicidal ideation was denied.   Homicidal ideation was denied.   Hygiene was good .  Dress was appropriate.   Behavior was Within Normal Limits with No observation of difficulties ambulating.   Attitude was Cooperative.  Eye-contact was

## 2025-07-28 NOTE — PROGRESS NOTES
Zheng Clarke (:  1990) is a 35 y.o. male,Established patient, here for evaluation of the following chief complaint(s):  Medication Check (Medication is working better)      Assessment & Plan  Epistaxis  Orders:    saline nasal gel (AYR) GEL; by Nasal route as needed for Congestion    Anxiety    Depression, unspecified depression type      - Continue medications as prescribed.  Discussed pros and cons of medication and potential side effects, and due to increased heat and humidity, as well as work environment, will continue medication as prescribed.  - Use saline gel once to twice daily applied to both nostrils to increase moisture and nasal mucosal membrane.  - Monitor symptoms for continued increased headaches and nosebleeds  - If patient has a nosebleed that he cannot stop, encouraged to go to ER for evaluation and treatment.   - Discouraged the use of ibuprofen and encouraged Tylenol for pain and headaches.  - Understanding and agreement was voiced with all above plans. All questions answered to satisfaction.   Call office with any questions or new or worsening symptoms or concerns.       Return in about 2 weeks (around 2025) for Anxiety/depression/medication follow-up.    Subjective       Surgical History:   Past Surgical History:   Procedure Laterality Date    ANTERIOR CRUCIATE LIGAMENT REPAIR Right     TONSILLECTOMY         Patient Active Problem List   Diagnosis    Anxiety    Current smoker    Panic attack    Varicose veins of left lower extremity        HPI    Depression 2 wk F/u after starting Prozac: Patient reports he is feeling better than he was before, and feels this medication is working better than previous as needed treatment.     Still has difficult time with motivation and concentration.  He does report sleep has improved to 4 to 5 hours at night, but remains with difficulty falling and staying asleep.  He denies any thoughts of hurting himself or others.  He had 3 days since

## 2025-07-29 ENCOUNTER — OFFICE VISIT (OUTPATIENT)
Dept: FAMILY MEDICINE CLINIC | Age: 35
End: 2025-07-29
Payer: MEDICAID

## 2025-07-29 VITALS
HEIGHT: 67 IN | SYSTOLIC BLOOD PRESSURE: 112 MMHG | BODY MASS INDEX: 32.65 KG/M2 | WEIGHT: 208 LBS | OXYGEN SATURATION: 97 % | HEART RATE: 70 BPM | DIASTOLIC BLOOD PRESSURE: 70 MMHG

## 2025-07-29 DIAGNOSIS — F41.9 ANXIETY: ICD-10-CM

## 2025-07-29 DIAGNOSIS — F32.A DEPRESSION, UNSPECIFIED DEPRESSION TYPE: ICD-10-CM

## 2025-07-29 DIAGNOSIS — R04.0 EPISTAXIS: Primary | ICD-10-CM

## 2025-07-29 PROCEDURE — 99213 OFFICE O/P EST LOW 20 MIN: CPT

## 2025-07-29 RX ORDER — SODIUM CHLORIDE/ALOE VERA
GEL (GRAM) NASAL PRN
Qty: 14.1 G | Refills: 3 | Status: SHIPPED | OUTPATIENT
Start: 2025-07-29

## 2025-07-29 ASSESSMENT — PATIENT HEALTH QUESTIONNAIRE - PHQ9
6. FEELING BAD ABOUT YOURSELF - OR THAT YOU ARE A FAILURE OR HAVE LET YOURSELF OR YOUR FAMILY DOWN: MORE THAN HALF THE DAYS
SUM OF ALL RESPONSES TO PHQ QUESTIONS 1-9: 11
SUM OF ALL RESPONSES TO PHQ QUESTIONS 1-9: 10
10. IF YOU CHECKED OFF ANY PROBLEMS, HOW DIFFICULT HAVE THESE PROBLEMS MADE IT FOR YOU TO DO YOUR WORK, TAKE CARE OF THINGS AT HOME, OR GET ALONG WITH OTHER PEOPLE: SOMEWHAT DIFFICULT
6. FEELING BAD ABOUT YOURSELF - OR THAT YOU ARE A FAILURE OR HAVE LET YOURSELF OR YOUR FAMILY DOWN: SEVERAL DAYS
1. LITTLE INTEREST OR PLEASURE IN DOING THINGS: SEVERAL DAYS
SUM OF ALL RESPONSES TO PHQ QUESTIONS 1-9: 10
7. TROUBLE CONCENTRATING ON THINGS, SUCH AS READING THE NEWSPAPER OR WATCHING TELEVISION: SEVERAL DAYS
7. TROUBLE CONCENTRATING ON THINGS, SUCH AS READING THE NEWSPAPER OR WATCHING TELEVISION: SEVERAL DAYS
3. TROUBLE FALLING OR STAYING ASLEEP: MORE THAN HALF THE DAYS
1. LITTLE INTEREST OR PLEASURE IN DOING THINGS: SEVERAL DAYS
SUM OF ALL RESPONSES TO PHQ QUESTIONS 1-9: 10
SUM OF ALL RESPONSES TO PHQ QUESTIONS 1-9: 12
SUM OF ALL RESPONSES TO PHQ QUESTIONS 1-9: 10
SUM OF ALL RESPONSES TO PHQ QUESTIONS 1-9: 12
10. IF YOU CHECKED OFF ANY PROBLEMS, HOW DIFFICULT HAVE THESE PROBLEMS MADE IT FOR YOU TO DO YOUR WORK, TAKE CARE OF THINGS AT HOME, OR GET ALONG WITH OTHER PEOPLE: SOMEWHAT DIFFICULT
8. MOVING OR SPEAKING SO SLOWLY THAT OTHER PEOPLE COULD HAVE NOTICED. OR THE OPPOSITE, BEING SO FIGETY OR RESTLESS THAT YOU HAVE BEEN MOVING AROUND A LOT MORE THAN USUAL: SEVERAL DAYS
10. IF YOU CHECKED OFF ANY PROBLEMS, HOW DIFFICULT HAVE THESE PROBLEMS MADE IT FOR YOU TO DO YOUR WORK, TAKE CARE OF THINGS AT HOME, OR GET ALONG WITH OTHER PEOPLE: SOMEWHAT DIFFICULT
6. FEELING BAD ABOUT YOURSELF - OR THAT YOU ARE A FAILURE OR HAVE LET YOURSELF OR YOUR FAMILY DOWN: MORE THAN HALF THE DAYS
4. FEELING TIRED OR HAVING LITTLE ENERGY: SEVERAL DAYS
3. TROUBLE FALLING OR STAYING ASLEEP: SEVERAL DAYS
5. POOR APPETITE OR OVEREATING: NEARLY EVERY DAY
8. MOVING OR SPEAKING SO SLOWLY THAT OTHER PEOPLE COULD HAVE NOTICED. OR THE OPPOSITE, BEING SO FIGETY OR RESTLESS THAT YOU HAVE BEEN MOVING AROUND A LOT MORE THAN USUAL: SEVERAL DAYS
4. FEELING TIRED OR HAVING LITTLE ENERGY: SEVERAL DAYS
9. THOUGHTS THAT YOU WOULD BE BETTER OFF DEAD, OR OF HURTING YOURSELF: NOT AT ALL
8. MOVING OR SPEAKING SO SLOWLY THAT OTHER PEOPLE COULD HAVE NOTICED. OR THE OPPOSITE - BEING SO FIDGETY OR RESTLESS THAT YOU HAVE BEEN MOVING AROUND A LOT MORE THAN USUAL: SEVERAL DAYS
9. THOUGHTS THAT YOU WOULD BE BETTER OFF DEAD, OR OF HURTING YOURSELF: SEVERAL DAYS
5. POOR APPETITE OR OVEREATING: NEARLY EVERY DAY
2. FEELING DOWN, DEPRESSED OR HOPELESS: SEVERAL DAYS
5. POOR APPETITE OR OVEREATING: NEARLY EVERY DAY
2. FEELING DOWN, DEPRESSED OR HOPELESS: SEVERAL DAYS
4. FEELING TIRED OR HAVING LITTLE ENERGY: SEVERAL DAYS
3. TROUBLE FALLING OR STAYING ASLEEP: SEVERAL DAYS
9. THOUGHTS THAT YOU WOULD BE BETTER OFF DEAD, OR OF HURTING YOURSELF: SEVERAL DAYS
2. FEELING DOWN, DEPRESSED OR HOPELESS: SEVERAL DAYS
SUM OF ALL RESPONSES TO PHQ QUESTIONS 1-9: 12
SUM OF ALL RESPONSES TO PHQ QUESTIONS 1-9: 12
7. TROUBLE CONCENTRATING ON THINGS, SUCH AS READING THE NEWSPAPER OR WATCHING TELEVISION: NOT AT ALL
1. LITTLE INTEREST OR PLEASURE IN DOING THINGS: SEVERAL DAYS

## 2025-07-29 ASSESSMENT — ANXIETY QUESTIONNAIRES
1. FEELING NERVOUS, ANXIOUS, OR ON EDGE: SEVERAL DAYS
3. WORRYING TOO MUCH ABOUT DIFFERENT THINGS: SEVERAL DAYS
7. FEELING AFRAID AS IF SOMETHING AWFUL MIGHT HAPPEN: SEVERAL DAYS
5. BEING SO RESTLESS THAT IT IS HARD TO SIT STILL: MORE THAN HALF THE DAYS
2. NOT BEING ABLE TO STOP OR CONTROL WORRYING: SEVERAL DAYS
7. FEELING AFRAID AS IF SOMETHING AWFUL MIGHT HAPPEN: SEVERAL DAYS
IF YOU CHECKED OFF ANY PROBLEMS ON THIS QUESTIONNAIRE, HOW DIFFICULT HAVE THESE PROBLEMS MADE IT FOR YOU TO DO YOUR WORK, TAKE CARE OF THINGS AT HOME, OR GET ALONG WITH OTHER PEOPLE: SOMEWHAT DIFFICULT
2. NOT BEING ABLE TO STOP OR CONTROL WORRYING: SEVERAL DAYS
6. BECOMING EASILY ANNOYED OR IRRITABLE: MORE THAN HALF THE DAYS
IF YOU CHECKED OFF ANY PROBLEMS ON THIS QUESTIONNAIRE, HOW DIFFICULT HAVE THESE PROBLEMS MADE IT FOR YOU TO DO YOUR WORK, TAKE CARE OF THINGS AT HOME, OR GET ALONG WITH OTHER PEOPLE: SOMEWHAT DIFFICULT
4. TROUBLE RELAXING: MORE THAN HALF THE DAYS
4. TROUBLE RELAXING: MORE THAN HALF THE DAYS
3. WORRYING TOO MUCH ABOUT DIFFERENT THINGS: SEVERAL DAYS
GAD7 TOTAL SCORE: 10
6. BECOMING EASILY ANNOYED OR IRRITABLE: MORE THAN HALF THE DAYS
5. BEING SO RESTLESS THAT IT IS HARD TO SIT STILL: MORE THAN HALF THE DAYS
1. FEELING NERVOUS, ANXIOUS, OR ON EDGE: SEVERAL DAYS

## 2025-07-29 ASSESSMENT — COLUMBIA-SUICIDE SEVERITY RATING SCALE - C-SSRS
1. IN THE PAST MONTH, HAVE YOU WISHED YOU WERE DEAD OR WISHED YOU COULD GO TO SLEEP AND NOT WAKE UP?: YES
2. IN THE PAST MONTH, HAVE YOU ACTUALLY HAD ANY THOUGHTS OF KILLING YOURSELF?: NO
6. IN YOUR LIFETIME, HAVE YOU EVER DONE ANYTHING, STARTED TO DO ANYTHING, OR PREPARED TO DO ANYTHING TO END YOUR LIFE?: NO

## 2025-07-29 ASSESSMENT — ENCOUNTER SYMPTOMS
ABDOMINAL PAIN: 0
SHORTNESS OF BREATH: 0
NAUSEA: 1
COUGH: 0

## 2025-07-30 ENCOUNTER — TELEMEDICINE (OUTPATIENT)
Dept: BEHAVIORAL/MENTAL HEALTH CLINIC | Age: 35
End: 2025-07-30

## 2025-07-30 DIAGNOSIS — F41.0 GENERALIZED ANXIETY DISORDER WITH PANIC ATTACKS: Primary | ICD-10-CM

## 2025-07-30 DIAGNOSIS — F41.1 GENERALIZED ANXIETY DISORDER WITH PANIC ATTACKS: Primary | ICD-10-CM

## 2025-07-30 DIAGNOSIS — F33.2 MAJOR DEPRESSIVE DISORDER, RECURRENT, SEVERE WITHOUT PSYCHOTIC FEATURES (HCC): ICD-10-CM

## 2025-08-05 ENCOUNTER — TELEPHONE (OUTPATIENT)
Dept: FAMILY MEDICINE CLINIC | Age: 35
End: 2025-08-05

## 2025-08-12 ENCOUNTER — OFFICE VISIT (OUTPATIENT)
Dept: FAMILY MEDICINE CLINIC | Age: 35
End: 2025-08-12
Payer: MEDICAID

## 2025-08-12 VITALS
HEIGHT: 67 IN | TEMPERATURE: 98 F | OXYGEN SATURATION: 98 % | BODY MASS INDEX: 32.33 KG/M2 | SYSTOLIC BLOOD PRESSURE: 126 MMHG | WEIGHT: 206 LBS | DIASTOLIC BLOOD PRESSURE: 76 MMHG | HEART RATE: 68 BPM

## 2025-08-12 DIAGNOSIS — F32.A DEPRESSION, UNSPECIFIED DEPRESSION TYPE: Primary | ICD-10-CM

## 2025-08-12 DIAGNOSIS — T43.225A SSRI (SELECTIVE SEROTONIN REUPTAKE INHIBITOR) CAUSING ADVERSE EFFECT IN THERAPEUTIC USE, INITIAL ENCOUNTER: ICD-10-CM

## 2025-08-12 DIAGNOSIS — F41.9 ANXIETY: ICD-10-CM

## 2025-08-12 PROCEDURE — 99214 OFFICE O/P EST MOD 30 MIN: CPT

## 2025-08-12 RX ORDER — HYDROXYZINE HYDROCHLORIDE 25 MG/1
25 TABLET, FILM COATED ORAL EVERY 8 HOURS PRN
Qty: 60 TABLET | Refills: 0 | Status: SHIPPED | OUTPATIENT
Start: 2025-08-12 | End: 2025-09-11

## 2025-08-12 RX ORDER — BUPROPION HYDROCHLORIDE 150 MG/1
150 TABLET ORAL EVERY MORNING
Qty: 30 TABLET | Refills: 0 | Status: SHIPPED | OUTPATIENT
Start: 2025-08-12

## 2025-08-12 ASSESSMENT — COLUMBIA-SUICIDE SEVERITY RATING SCALE - C-SSRS
6. HAVE YOU EVER DONE ANYTHING, STARTED TO DO ANYTHING, OR PREPARED TO DO ANYTHING TO END YOUR LIFE?: YES
1. WITHIN THE PAST MONTH, HAVE YOU WISHED YOU WERE DEAD OR WISHED YOU COULD GO TO SLEEP AND NOT WAKE UP?: NO
2. HAVE YOU ACTUALLY HAD ANY THOUGHTS OF KILLING YOURSELF?: NO
7. DID THIS OCCUR IN THE LAST THREE MONTHS: YES

## 2025-08-12 ASSESSMENT — PATIENT HEALTH QUESTIONNAIRE - PHQ9
SUM OF ALL RESPONSES TO PHQ QUESTIONS 1-9: 24
SUM OF ALL RESPONSES TO PHQ QUESTIONS 1-9: 24
2. FEELING DOWN, DEPRESSED OR HOPELESS: NEARLY EVERY DAY
1. LITTLE INTEREST OR PLEASURE IN DOING THINGS: NEARLY EVERY DAY
SUM OF ALL RESPONSES TO PHQ QUESTIONS 1-9: 24
4. FEELING TIRED OR HAVING LITTLE ENERGY: NEARLY EVERY DAY
3. TROUBLE FALLING OR STAYING ASLEEP: NEARLY EVERY DAY
7. TROUBLE CONCENTRATING ON THINGS, SUCH AS READING THE NEWSPAPER OR WATCHING TELEVISION: NEARLY EVERY DAY
8. MOVING OR SPEAKING SO SLOWLY THAT OTHER PEOPLE COULD HAVE NOTICED. OR THE OPPOSITE, BEING SO FIGETY OR RESTLESS THAT YOU HAVE BEEN MOVING AROUND A LOT MORE THAN USUAL: NEARLY EVERY DAY
6. FEELING BAD ABOUT YOURSELF - OR THAT YOU ARE A FAILURE OR HAVE LET YOURSELF OR YOUR FAMILY DOWN: NEARLY EVERY DAY
5. POOR APPETITE OR OVEREATING: NEARLY EVERY DAY
SUM OF ALL RESPONSES TO PHQ QUESTIONS 1-9: 24
10. IF YOU CHECKED OFF ANY PROBLEMS, HOW DIFFICULT HAVE THESE PROBLEMS MADE IT FOR YOU TO DO YOUR WORK, TAKE CARE OF THINGS AT HOME, OR GET ALONG WITH OTHER PEOPLE: VERY DIFFICULT
9. THOUGHTS THAT YOU WOULD BE BETTER OFF DEAD, OR OF HURTING YOURSELF: NOT AT ALL

## 2025-08-12 ASSESSMENT — ENCOUNTER SYMPTOMS
VOMITING: 0
SHORTNESS OF BREATH: 1
ABDOMINAL PAIN: 1
COUGH: 0
NAUSEA: 0
WHEEZING: 0

## 2025-08-22 ENCOUNTER — TELEMEDICINE (OUTPATIENT)
Dept: BEHAVIORAL/MENTAL HEALTH CLINIC | Age: 35
End: 2025-08-22
Payer: MEDICAID

## 2025-08-22 DIAGNOSIS — F41.1 GENERALIZED ANXIETY DISORDER WITH PANIC ATTACKS: Primary | ICD-10-CM

## 2025-08-22 DIAGNOSIS — F41.0 GENERALIZED ANXIETY DISORDER WITH PANIC ATTACKS: Primary | ICD-10-CM

## 2025-08-22 DIAGNOSIS — F33.2 MAJOR DEPRESSIVE DISORDER, RECURRENT, SEVERE WITHOUT PSYCHOTIC FEATURES (HCC): ICD-10-CM

## 2025-08-22 PROCEDURE — 90832 PSYTX W PT 30 MINUTES: CPT | Performed by: COUNSELOR

## 2025-08-22 ASSESSMENT — PATIENT HEALTH QUESTIONNAIRE - PHQ9
9. THOUGHTS THAT YOU WOULD BE BETTER OFF DEAD, OR OF HURTING YOURSELF: NOT AT ALL
7. TROUBLE CONCENTRATING ON THINGS, SUCH AS READING THE NEWSPAPER OR WATCHING TELEVISION: MORE THAN HALF THE DAYS
10. IF YOU CHECKED OFF ANY PROBLEMS, HOW DIFFICULT HAVE THESE PROBLEMS MADE IT FOR YOU TO DO YOUR WORK, TAKE CARE OF THINGS AT HOME, OR GET ALONG WITH OTHER PEOPLE: SOMEWHAT DIFFICULT
6. FEELING BAD ABOUT YOURSELF - OR THAT YOU ARE A FAILURE OR HAVE LET YOURSELF OR YOUR FAMILY DOWN: MORE THAN HALF THE DAYS
3. TROUBLE FALLING OR STAYING ASLEEP: NEARLY EVERY DAY
SUM OF ALL RESPONSES TO PHQ QUESTIONS 1-9: 19
3. TROUBLE FALLING OR STAYING ASLEEP: NEARLY EVERY DAY
7. TROUBLE CONCENTRATING ON THINGS, SUCH AS READING THE NEWSPAPER OR WATCHING TELEVISION: MORE THAN HALF THE DAYS
SUM OF ALL RESPONSES TO PHQ QUESTIONS 1-9: 19
8. MOVING OR SPEAKING SO SLOWLY THAT OTHER PEOPLE COULD HAVE NOTICED. OR THE OPPOSITE, BEING SO FIGETY OR RESTLESS THAT YOU HAVE BEEN MOVING AROUND A LOT MORE THAN USUAL: MORE THAN HALF THE DAYS
SUM OF ALL RESPONSES TO PHQ QUESTIONS 1-9: 19
5. POOR APPETITE OR OVEREATING: NEARLY EVERY DAY
9. THOUGHTS THAT YOU WOULD BE BETTER OFF DEAD, OR OF HURTING YOURSELF: NOT AT ALL
10. IF YOU CHECKED OFF ANY PROBLEMS, HOW DIFFICULT HAVE THESE PROBLEMS MADE IT FOR YOU TO DO YOUR WORK, TAKE CARE OF THINGS AT HOME, OR GET ALONG WITH OTHER PEOPLE: SOMEWHAT DIFFICULT
SUM OF ALL RESPONSES TO PHQ QUESTIONS 1-9: 19
4. FEELING TIRED OR HAVING LITTLE ENERGY: NEARLY EVERY DAY
1. LITTLE INTEREST OR PLEASURE IN DOING THINGS: MORE THAN HALF THE DAYS
4. FEELING TIRED OR HAVING LITTLE ENERGY: NEARLY EVERY DAY
2. FEELING DOWN, DEPRESSED OR HOPELESS: MORE THAN HALF THE DAYS
SUM OF ALL RESPONSES TO PHQ QUESTIONS 1-9: 19
2. FEELING DOWN, DEPRESSED OR HOPELESS: MORE THAN HALF THE DAYS
1. LITTLE INTEREST OR PLEASURE IN DOING THINGS: MORE THAN HALF THE DAYS
6. FEELING BAD ABOUT YOURSELF - OR THAT YOU ARE A FAILURE OR HAVE LET YOURSELF OR YOUR FAMILY DOWN: MORE THAN HALF THE DAYS
5. POOR APPETITE OR OVEREATING: NEARLY EVERY DAY
8. MOVING OR SPEAKING SO SLOWLY THAT OTHER PEOPLE COULD HAVE NOTICED. OR THE OPPOSITE - BEING SO FIDGETY OR RESTLESS THAT YOU HAVE BEEN MOVING AROUND A LOT MORE THAN USUAL: MORE THAN HALF THE DAYS

## 2025-08-22 ASSESSMENT — ANXIETY QUESTIONNAIRES
2. NOT BEING ABLE TO STOP OR CONTROL WORRYING: NEARLY EVERY DAY
1. FEELING NERVOUS, ANXIOUS, OR ON EDGE: NEARLY EVERY DAY
6. BECOMING EASILY ANNOYED OR IRRITABLE: NEARLY EVERY DAY
7. FEELING AFRAID AS IF SOMETHING AWFUL MIGHT HAPPEN: MORE THAN HALF THE DAYS
1. FEELING NERVOUS, ANXIOUS, OR ON EDGE: NEARLY EVERY DAY
6. BECOMING EASILY ANNOYED OR IRRITABLE: NEARLY EVERY DAY
IF YOU CHECKED OFF ANY PROBLEMS ON THIS QUESTIONNAIRE, HOW DIFFICULT HAVE THESE PROBLEMS MADE IT FOR YOU TO DO YOUR WORK, TAKE CARE OF THINGS AT HOME, OR GET ALONG WITH OTHER PEOPLE: VERY DIFFICULT
5. BEING SO RESTLESS THAT IT IS HARD TO SIT STILL: NEARLY EVERY DAY
2. NOT BEING ABLE TO STOP OR CONTROL WORRYING: NEARLY EVERY DAY
5. BEING SO RESTLESS THAT IT IS HARD TO SIT STILL: NEARLY EVERY DAY
3. WORRYING TOO MUCH ABOUT DIFFERENT THINGS: NEARLY EVERY DAY
GAD7 TOTAL SCORE: 20
IF YOU CHECKED OFF ANY PROBLEMS ON THIS QUESTIONNAIRE, HOW DIFFICULT HAVE THESE PROBLEMS MADE IT FOR YOU TO DO YOUR WORK, TAKE CARE OF THINGS AT HOME, OR GET ALONG WITH OTHER PEOPLE: VERY DIFFICULT
4. TROUBLE RELAXING: NEARLY EVERY DAY
4. TROUBLE RELAXING: NEARLY EVERY DAY
3. WORRYING TOO MUCH ABOUT DIFFERENT THINGS: NEARLY EVERY DAY
7. FEELING AFRAID AS IF SOMETHING AWFUL MIGHT HAPPEN: MORE THAN HALF THE DAYS

## 2025-08-26 ENCOUNTER — OFFICE VISIT (OUTPATIENT)
Dept: FAMILY MEDICINE CLINIC | Age: 35
End: 2025-08-26
Payer: MEDICAID

## 2025-08-26 VITALS
HEIGHT: 67 IN | BODY MASS INDEX: 32.49 KG/M2 | OXYGEN SATURATION: 97 % | WEIGHT: 207 LBS | SYSTOLIC BLOOD PRESSURE: 120 MMHG | HEART RATE: 87 BPM | DIASTOLIC BLOOD PRESSURE: 64 MMHG

## 2025-08-26 DIAGNOSIS — F32.A DEPRESSION, UNSPECIFIED DEPRESSION TYPE: ICD-10-CM

## 2025-08-26 DIAGNOSIS — F41.9 ANXIETY: Primary | ICD-10-CM

## 2025-08-26 PROCEDURE — 99212 OFFICE O/P EST SF 10 MIN: CPT

## 2025-08-26 ASSESSMENT — PATIENT HEALTH QUESTIONNAIRE - PHQ9
2. FEELING DOWN, DEPRESSED OR HOPELESS: SEVERAL DAYS
4. FEELING TIRED OR HAVING LITTLE ENERGY: NEARLY EVERY DAY
SUM OF ALL RESPONSES TO PHQ QUESTIONS 1-9: 14
SUM OF ALL RESPONSES TO PHQ QUESTIONS 1-9: 14
6. FEELING BAD ABOUT YOURSELF - OR THAT YOU ARE A FAILURE OR HAVE LET YOURSELF OR YOUR FAMILY DOWN: SEVERAL DAYS
9. THOUGHTS THAT YOU WOULD BE BETTER OFF DEAD, OR OF HURTING YOURSELF: NOT AT ALL
5. POOR APPETITE OR OVEREATING: SEVERAL DAYS
SUM OF ALL RESPONSES TO PHQ QUESTIONS 1-9: 14
8. MOVING OR SPEAKING SO SLOWLY THAT OTHER PEOPLE COULD HAVE NOTICED. OR THE OPPOSITE, BEING SO FIGETY OR RESTLESS THAT YOU HAVE BEEN MOVING AROUND A LOT MORE THAN USUAL: SEVERAL DAYS
1. LITTLE INTEREST OR PLEASURE IN DOING THINGS: NEARLY EVERY DAY
3. TROUBLE FALLING OR STAYING ASLEEP: NEARLY EVERY DAY
SUM OF ALL RESPONSES TO PHQ QUESTIONS 1-9: 14
10. IF YOU CHECKED OFF ANY PROBLEMS, HOW DIFFICULT HAVE THESE PROBLEMS MADE IT FOR YOU TO DO YOUR WORK, TAKE CARE OF THINGS AT HOME, OR GET ALONG WITH OTHER PEOPLE: VERY DIFFICULT
7. TROUBLE CONCENTRATING ON THINGS, SUCH AS READING THE NEWSPAPER OR WATCHING TELEVISION: SEVERAL DAYS

## 2025-08-26 ASSESSMENT — ENCOUNTER SYMPTOMS
SHORTNESS OF BREATH: 0
COUGH: 0